# Patient Record
Sex: MALE | Race: WHITE | NOT HISPANIC OR LATINO | Employment: FULL TIME | URBAN - METROPOLITAN AREA
[De-identification: names, ages, dates, MRNs, and addresses within clinical notes are randomized per-mention and may not be internally consistent; named-entity substitution may affect disease eponyms.]

---

## 2017-01-05 ENCOUNTER — APPOINTMENT (OUTPATIENT)
Dept: GENERAL RADIOLOGY | Facility: HOSPITAL | Age: 52
End: 2017-01-05

## 2017-01-05 ENCOUNTER — HOSPITAL ENCOUNTER (EMERGENCY)
Facility: HOSPITAL | Age: 52
Discharge: HOME | End: 2017-01-05
Attending: EMERGENCY MEDICINE | Admitting: EMERGENCY MEDICINE

## 2017-01-05 VITALS
RESPIRATION RATE: 18 BRPM | HEART RATE: 80 BPM | WEIGHT: 225 LBS | DIASTOLIC BLOOD PRESSURE: 88 MMHG | BODY MASS INDEX: 29.82 KG/M2 | TEMPERATURE: 99.5 F | OXYGEN SATURATION: 97 % | SYSTOLIC BLOOD PRESSURE: 133 MMHG | HEIGHT: 73 IN

## 2017-01-05 DIAGNOSIS — M70.21 OLECRANON BURSITIS OF RIGHT ELBOW: Primary | ICD-10-CM

## 2017-01-05 LAB
ALBUMIN SERPL-MCNC: 4.2 G/DL (ref 3.5–5.2)
ALBUMIN/GLOB SERPL: 1.3 G/DL
ALP SERPL-CCNC: 84 U/L (ref 39–117)
ALT SERPL W P-5'-P-CCNC: 38 U/L (ref 1–41)
ANION GAP SERPL CALCULATED.3IONS-SCNC: 15.1 MMOL/L
AST SERPL-CCNC: 35 U/L (ref 1–40)
BASOPHILS # BLD AUTO: 0.05 10*3/MM3 (ref 0–0.2)
BASOPHILS NFR BLD AUTO: 0.5 % (ref 0–1.5)
BILIRUB SERPL-MCNC: 0.6 MG/DL (ref 0.1–1.2)
BUN BLD-MCNC: 18 MG/DL (ref 6–20)
BUN/CREAT SERPL: 19.6 (ref 7–25)
CALCIUM SPEC-SCNC: 9.1 MG/DL (ref 8.6–10.5)
CHLORIDE SERPL-SCNC: 101 MMOL/L (ref 98–107)
CO2 SERPL-SCNC: 24.9 MMOL/L (ref 22–29)
CREAT BLD-MCNC: 0.92 MG/DL (ref 0.76–1.27)
CRP SERPL-MCNC: 0.52 MG/DL (ref 0–0.5)
DEPRECATED RDW RBC AUTO: 46.1 FL (ref 37–54)
EOSINOPHIL # BLD AUTO: 0.13 10*3/MM3 (ref 0–0.7)
EOSINOPHIL NFR BLD AUTO: 1.3 % (ref 0.3–6.2)
ERYTHROCYTE [DISTWIDTH] IN BLOOD BY AUTOMATED COUNT: 13 % (ref 11.5–14.5)
ERYTHROCYTE [SEDIMENTATION RATE] IN BLOOD: 8 MM/HR (ref 0–20)
GFR SERPL CREATININE-BSD FRML MDRD: 86 ML/MIN/1.73
GLOBULIN UR ELPH-MCNC: 3.3 GM/DL
GLUCOSE BLD-MCNC: 122 MG/DL (ref 65–99)
HCT VFR BLD AUTO: 46 % (ref 40.4–52.2)
HGB BLD-MCNC: 14.9 G/DL (ref 13.7–17.6)
IMM GRANULOCYTES # BLD: 0 10*3/MM3 (ref 0–0.03)
IMM GRANULOCYTES NFR BLD: 0 % (ref 0–0.5)
LYMPHOCYTES # BLD AUTO: 2.58 10*3/MM3 (ref 0.9–4.8)
LYMPHOCYTES NFR BLD AUTO: 25.2 % (ref 19.6–45.3)
MCH RBC QN AUTO: 31.3 PG (ref 27–32.7)
MCHC RBC AUTO-ENTMCNC: 32.4 G/DL (ref 32.6–36.4)
MCV RBC AUTO: 96.6 FL (ref 79.8–96.2)
MONOCYTES # BLD AUTO: 1.15 10*3/MM3 (ref 0.2–1.2)
MONOCYTES NFR BLD AUTO: 11.2 % (ref 5–12)
NEUTROPHILS # BLD AUTO: 6.34 10*3/MM3 (ref 1.9–8.1)
NEUTROPHILS NFR BLD AUTO: 61.8 % (ref 42.7–76)
NRBC BLD MANUAL-RTO: 0 /100 WBC (ref 0–0)
PLATELET # BLD AUTO: 264 10*3/MM3 (ref 140–500)
PMV BLD AUTO: 10.4 FL (ref 6–12)
POTASSIUM BLD-SCNC: 4.1 MMOL/L (ref 3.5–5.2)
PROT SERPL-MCNC: 7.5 G/DL (ref 6–8.5)
RBC # BLD AUTO: 4.76 10*6/MM3 (ref 4.6–6)
SODIUM BLD-SCNC: 141 MMOL/L (ref 136–145)
WBC NRBC COR # BLD: 10.25 10*3/MM3 (ref 4.5–10.7)

## 2017-01-05 PROCEDURE — 99283 EMERGENCY DEPT VISIT LOW MDM: CPT

## 2017-01-05 PROCEDURE — 80053 COMPREHEN METABOLIC PANEL: CPT | Performed by: EMERGENCY MEDICINE

## 2017-01-05 PROCEDURE — 86140 C-REACTIVE PROTEIN: CPT | Performed by: NURSE PRACTITIONER

## 2017-01-05 PROCEDURE — 36415 COLL VENOUS BLD VENIPUNCTURE: CPT | Performed by: EMERGENCY MEDICINE

## 2017-01-05 PROCEDURE — 73070 X-RAY EXAM OF ELBOW: CPT

## 2017-01-05 PROCEDURE — 85652 RBC SED RATE AUTOMATED: CPT | Performed by: NURSE PRACTITIONER

## 2017-01-05 PROCEDURE — 85025 COMPLETE CBC W/AUTO DIFF WBC: CPT | Performed by: EMERGENCY MEDICINE

## 2017-01-05 RX ORDER — AMOXICILLIN AND CLAVULANATE POTASSIUM 875; 125 MG/1; MG/1
1 TABLET, FILM COATED ORAL EVERY 12 HOURS
Qty: 20 TABLET | Refills: 0 | Status: SHIPPED | OUTPATIENT
Start: 2017-01-05 | End: 2017-01-13 | Stop reason: HOSPADM

## 2017-01-06 ENCOUNTER — TELEPHONE (OUTPATIENT)
Dept: ORTHOPEDIC SURGERY | Facility: CLINIC | Age: 52
End: 2017-01-06

## 2017-01-06 NOTE — ED PROVIDER NOTES
EMERGENCY DEPARTMENT ENCOUNTER    CHIEF COMPLAINT  Chief Complaint: Elbow Swelling  History given by: Patient  History limited by: n/a  Room Number: 27/27  PMD: JOE Solorzano      HPI:  Pt is a 51 y.o. male who presents complaining of severe pain and swelling to his right elbow worsening over the past 24 hours. Patient is right handed. He has had subjective fever/chills. He has had no known injury or trauma to his elbow.  Patient denies numbness, chest pain, SOB.    Past Medical History includes HTN    Duration: 24 hours  Timing: Constant  Location: Right elbow  Radiation: none  Quality: Throbbing  Intensity/Severity: Severe  Progression: Worsening  Associated Symptoms: chills  Previous Episodes: None  Treatment before arrival: None    PAST MEDICAL HISTORY  Active Ambulatory Problems     Diagnosis Date Noted   • No Active Ambulatory Problems     Resolved Ambulatory Problems     Diagnosis Date Noted   • No Resolved Ambulatory Problems     Past Medical History   Diagnosis Date   • Hyperlipidemia    • Hypertension        PAST SURGICAL HISTORY  History reviewed. No pertinent past surgical history.    FAMILY HISTORY  History reviewed. No pertinent family history.    SOCIAL HISTORY  Social History     Social History   • Marital status:      Spouse name: N/A   • Number of children: N/A   • Years of education: N/A     Occupational History   • Not on file.     Social History Main Topics   • Smoking status: Never Smoker   • Smokeless tobacco: Not on file   • Alcohol use Yes   • Drug use: No   • Sexual activity: Defer     Other Topics Concern   • Not on file     Social History Narrative   • No narrative on file         ALLERGIES  Review of patient's allergies indicates no known allergies.      REVIEW OF SYSTEMS  Review of Systems   Constitutional: Negative for chills and fever.   HENT: Negative for sore throat.    Gastrointestinal: Negative for nausea and vomiting.   Genitourinary: Negative for dysuria.    Musculoskeletal: Negative for back pain.        Right elbow pain & swelling   Skin: Negative for rash.   Psychiatric/Behavioral: The patient is not nervous/anxious.        PHYSICAL EXAM  ED Triage Vitals   Temp Heart Rate Resp BP SpO2   01/05/17 1622 01/05/17 1622 01/05/17 1622 01/05/17 1626 01/05/17 1622   99.7 °F (37.6 °C) 106 18 159/85 97 %      Temp src Heart Rate Source Patient Position BP Location FiO2 (%)   01/05/17 1622 01/05/17 1622 01/05/17 1626 01/05/17 1626 --   Tympanic Monitor Sitting Left arm        Physical Exam   Constitutional: He is well-developed, well-nourished, and in no distress. No distress.   HENT:   Head: Atraumatic.   Mouth/Throat: Mucous membranes are normal.   Eyes: No scleral icterus.   Neck: Normal range of motion.   Cardiovascular: Normal rate, regular rhythm and normal heart sounds.    Pulses:       Radial pulses are 2+ on the right side, and 2+ on the left side.   Pulmonary/Chest: Effort normal and breath sounds normal.   Musculoskeletal: Normal range of motion.        Right elbow: He exhibits swelling (marked swelling over the olecranon). Tenderness found.        Left elbow: Normal.   Neurological: He is alert.   Skin: Skin is warm and dry. There is erythema (right elbow).   Psychiatric: Mood and affect normal.   Nursing note and vitals reviewed.      LAB RESULTS  Recent Results (from the past 24 hour(s))   Comprehensive Metabolic Panel    Collection Time: 01/05/17  4:44 PM   Result Value Ref Range    Glucose 122 (H) 65 - 99 mg/dL    BUN 18 6 - 20 mg/dL    Creatinine 0.92 0.76 - 1.27 mg/dL    Sodium 141 136 - 145 mmol/L    Potassium 4.1 3.5 - 5.2 mmol/L    Chloride 101 98 - 107 mmol/L    CO2 24.9 22.0 - 29.0 mmol/L    Calcium 9.1 8.6 - 10.5 mg/dL    Total Protein 7.5 6.0 - 8.5 g/dL    Albumin 4.20 3.50 - 5.20 g/dL    ALT (SGPT) 38 1 - 41 U/L    AST (SGOT) 35 1 - 40 U/L    Alkaline Phosphatase 84 39 - 117 U/L    Total Bilirubin 0.6 0.1 - 1.2 mg/dL    eGFR Non  Amer 86 >60  mL/min/1.73    Globulin 3.3 gm/dL    A/G Ratio 1.3 g/dL    BUN/Creatinine Ratio 19.6 7.0 - 25.0    Anion Gap 15.1 mmol/L   CBC Auto Differential    Collection Time: 01/05/17  4:44 PM   Result Value Ref Range    WBC 10.25 4.50 - 10.70 10*3/mm3    RBC 4.76 4.60 - 6.00 10*6/mm3    Hemoglobin 14.9 13.7 - 17.6 g/dL    Hematocrit 46.0 40.4 - 52.2 %    MCV 96.6 (H) 79.8 - 96.2 fL    MCH 31.3 27.0 - 32.7 pg    MCHC 32.4 (L) 32.6 - 36.4 g/dL    RDW 13.0 11.5 - 14.5 %    RDW-SD 46.1 37.0 - 54.0 fl    MPV 10.4 6.0 - 12.0 fL    Platelets 264 140 - 500 10*3/mm3    Neutrophil % 61.8 42.7 - 76.0 %    Lymphocyte % 25.2 19.6 - 45.3 %    Monocyte % 11.2 5.0 - 12.0 %    Eosinophil % 1.3 0.3 - 6.2 %    Basophil % 0.5 0.0 - 1.5 %    Immature Grans % 0.0 0.0 - 0.5 %    Neutrophils, Absolute 6.34 1.90 - 8.10 10*3/mm3    Lymphocytes, Absolute 2.58 0.90 - 4.80 10*3/mm3    Monocytes, Absolute 1.15 0.20 - 1.20 10*3/mm3    Eosinophils, Absolute 0.13 0.00 - 0.70 10*3/mm3    Basophils, Absolute 0.05 0.00 - 0.20 10*3/mm3    Immature Grans, Absolute 0.00 0.00 - 0.03 10*3/mm3    nRBC 0.0 0.0 - 0.0 /100 WBC   C-reactive Protein    Collection Time: 01/05/17  4:44 PM   Result Value Ref Range    C-Reactive Protein 0.52 (H) 0.00 - 0.50 mg/dL   Sedimentation Rate    Collection Time: 01/05/17  4:44 PM   Result Value Ref Range    Sed Rate 8 0 - 20 mm/hr       I ordered the above labs and reviewed the results    RADIOLOGY  XR Elbow 2 View Right     FINDINGS: AP and lateral views of the elbow show no evidence of fracture  or dislocation. There is no evidence of a sail sign. There is extensive  soft tissue prominence dorsal to the olecranon process. The appearance  is nonspecific. This may be secondary to previous trauma, inflammation,  or bursitis. Further evaluation could be performed with a MRI  examination of the elbow as indicated.       I ordered the above noted radiological studies and reviewed the images on the PACS system.          PROGRESS AND  "CONSULTS    2022  Reviewed pt's history and workup with Dr. Calzada.  At bedside evaluation, they agree with the plan of care.    DISCHARGE    Patient discharged in stable condition.    Reviewed implications of results, diagnosis, meds, responsibility to follow up, warning signs and symptoms of possible worsening, potential complications and reasons to return to ER, including fever, chills, increased redness/swelling.    Patient/Family voiced understanding of above instructions.    Discussed plan for discharge, as there is no emergent indication for admission.  Pt/family is agreeable and understands need for follow up and repeat testing.  Pt is aware that discharge does not mean that nothing is wrong but it indicates no emergency is present that requires admission and they must continue care with follow-up as given below or physician of their choice.     FOLLOW-UP  JOE Rush  [address] [facility] [address] [phone]    Schedule an appointment as soon as possible for a visit in 1 day      Jovany Cavazos MD  [address] [id number] [phone]    Call in 1 day             amoxicillin-clavulanate 875-125 MG per tablet   Commonly known as:  AUGMENTIN   Take 1 tablet by mouth Every 12 (Twelve) Hours.           COURSE & MEDICAL DECISION MAKING  Pertinent Labs and Imaging studies that were ordered and reviewed are noted above.  Results were reviewed/discussed with the patient and they were also made aware of online assess.     Pt also made aware that some labs, such as cultures, will not be resulted during ER visit and follow up with PMD is necessary.     MEDICATIONS GIVEN IN ER  Medications - No data to display    Visit Vitals   • /88 (BP Location: Left arm, Patient Position: Sitting)   • Pulse 80   • Temp 99.5 °F (37.5 °C) (Oral)   • Resp 18   • Ht 73\" (185.4 cm)   • Wt 225 lb (102 kg)   • SpO2 97%   • BMI 29.69 kg/m2         I personally reviewed the past " medical history, past surgical history, social history, family history, current medications and allergies as they appear in this chart.  The scribe's note accurately reflects the work and decisions made by me.     I personally scribed for NISSA Nolasco 1/5/2017 at 7:05 PM. Electronically signed by Anderson Ying on 1/5/2017 at 7:05 PM.       Anderson Ying  01/05/17 2110       Khloe Motley, RUMA  01/11/17 0838

## 2017-01-06 NOTE — DISCHARGE INSTRUCTIONS
Medications as ordered  Wear ace wrap and ice to elbow  Follow up with pmd/orthopedic md-call in am for appointment  Ibuprofen as needed for pain  Return to er for fever, increased pain/swelling/redness to elbow or any new or worsening symptoms

## 2017-01-06 NOTE — ED PROVIDER NOTES
The patient presents complaining of R elbow swelling onset yesterday. Pt also complains of R elbow pain. Pt denies any trauma or a hx of gout.     Patient is nontoxic appearing   Back/extremities: Redness and inflammation of the R olecranon bursa with limited extension of the R arm      I supervised care provided by the midlevel provider.  We have discussed this patient's history, physical exam, and treatment plan.  I have reviewed the note and personally saw and examined the patient and agree with the plan of care.    Entered by Delfin Vail, acting as scribe for Wu Calzada MD.     Delfin Vail  01/05/17 2016       Wu Calzada MD  01/05/17 5454

## 2017-01-11 ENCOUNTER — TELEPHONE (OUTPATIENT)
Dept: ORTHOPEDIC SURGERY | Facility: CLINIC | Age: 52
End: 2017-01-11

## 2017-01-11 ENCOUNTER — APPOINTMENT (OUTPATIENT)
Dept: PREADMISSION TESTING | Facility: HOSPITAL | Age: 52
End: 2017-01-11

## 2017-01-11 ENCOUNTER — OFFICE VISIT (OUTPATIENT)
Dept: ORTHOPEDIC SURGERY | Facility: CLINIC | Age: 52
End: 2017-01-11

## 2017-01-11 VITALS — BODY MASS INDEX: 29.82 KG/M2 | WEIGHT: 225 LBS | HEIGHT: 73 IN | TEMPERATURE: 97.4 F

## 2017-01-11 DIAGNOSIS — M70.21 OLECRANON BURSITIS OF RIGHT ELBOW: Primary | ICD-10-CM

## 2017-01-11 LAB
ANION GAP SERPL CALCULATED.3IONS-SCNC: 15.8 MMOL/L
BUN BLD-MCNC: 20 MG/DL (ref 6–20)
BUN/CREAT SERPL: 24.7 (ref 7–25)
CALCIUM SPEC-SCNC: 9.6 MG/DL (ref 8.6–10.5)
CHLORIDE SERPL-SCNC: 102 MMOL/L (ref 98–107)
CO2 SERPL-SCNC: 22.2 MMOL/L (ref 22–29)
CREAT BLD-MCNC: 0.81 MG/DL (ref 0.76–1.27)
DEPRECATED RDW RBC AUTO: 45.7 FL (ref 37–54)
ERYTHROCYTE [DISTWIDTH] IN BLOOD BY AUTOMATED COUNT: 12.8 % (ref 11.5–14.5)
GFR SERPL CREATININE-BSD FRML MDRD: 100 ML/MIN/1.73
GLUCOSE BLD-MCNC: 115 MG/DL (ref 65–99)
HCT VFR BLD AUTO: 45.6 % (ref 40.4–52.2)
HGB BLD-MCNC: 14.9 G/DL (ref 13.7–17.6)
MCH RBC QN AUTO: 31.8 PG (ref 27–32.7)
MCHC RBC AUTO-ENTMCNC: 32.7 G/DL (ref 32.6–36.4)
MCV RBC AUTO: 97.4 FL (ref 79.8–96.2)
PLATELET # BLD AUTO: 350 10*3/MM3 (ref 140–500)
PMV BLD AUTO: 10.3 FL (ref 6–12)
POTASSIUM BLD-SCNC: 3.9 MMOL/L (ref 3.5–5.2)
RBC # BLD AUTO: 4.68 10*6/MM3 (ref 4.6–6)
SODIUM BLD-SCNC: 140 MMOL/L (ref 136–145)
WBC NRBC COR # BLD: 8.47 10*3/MM3 (ref 4.5–10.7)

## 2017-01-11 PROCEDURE — 99204 OFFICE O/P NEW MOD 45 MIN: CPT | Performed by: ORTHOPAEDIC SURGERY

## 2017-01-11 PROCEDURE — 87070 CULTURE OTHR SPECIMN AEROBIC: CPT | Performed by: ORTHOPAEDIC SURGERY

## 2017-01-11 PROCEDURE — 36415 COLL VENOUS BLD VENIPUNCTURE: CPT

## 2017-01-11 PROCEDURE — 93005 ELECTROCARDIOGRAM TRACING: CPT

## 2017-01-11 PROCEDURE — 85027 COMPLETE CBC AUTOMATED: CPT | Performed by: ORTHOPAEDIC SURGERY

## 2017-01-11 PROCEDURE — 20605 DRAIN/INJ JOINT/BURSA W/O US: CPT | Performed by: ORTHOPAEDIC SURGERY

## 2017-01-11 PROCEDURE — 80048 BASIC METABOLIC PNL TOTAL CA: CPT | Performed by: ORTHOPAEDIC SURGERY

## 2017-01-11 PROCEDURE — 93010 ELECTROCARDIOGRAM REPORT: CPT | Performed by: INTERNAL MEDICINE

## 2017-01-11 PROCEDURE — 87075 CULTR BACTERIA EXCEPT BLOOD: CPT | Performed by: ORTHOPAEDIC SURGERY

## 2017-01-11 PROCEDURE — 87205 SMEAR GRAM STAIN: CPT | Performed by: ORTHOPAEDIC SURGERY

## 2017-01-11 PROCEDURE — 89051 BODY FLUID CELL COUNT: CPT | Performed by: ORTHOPAEDIC SURGERY

## 2017-01-11 RX ORDER — ATORVASTATIN CALCIUM 10 MG/1
20 TABLET, FILM COATED ORAL DAILY
Status: ON HOLD | COMMUNITY
End: 2017-01-12 | Stop reason: SDUPTHER

## 2017-01-11 NOTE — MR AVS SNAPSHOT
Ochoa Mojica   2017 3:00 PM   Appointment    Provider:  BRAD CORRALES   Department:  [facility]   Dept Phone:  [phone]                Your Full Care Plan           To Do List     2017 3:00 PM     Appointment with  ALESSANDRA CORRALES at [facility] ([phone])   [address] [facility] [address]            Your Updated Medication List          This list is accurate as of: 17  2:59 PM.  Always use your most recent med list.                amoxicillin-clavulanate 875-125 MG per tablet   Commonly known as:  AUGMENTIN   Take 1 tablet by mouth Every 12 (Twelve) Hours.       atorvastatin 10 MG tablet   Commonly known as:  LIPITOR       LISINOPRIL PO       VERAPAMIL HCL ER (CO) PO               [employer] Signup     [facility] [employer] allows you to send messages to your doctor, view your test results, renew your prescriptions, schedule appointments, and more. To sign up, go to [employer] and click on the Sign Up Now link in the New User? box. Enter your Mem[employer] Activation Code exactly as it appears below along with the last four digits of your Social Security Number and your Date of Birth () to complete the sign-up process. If you do not sign up before the expiration date, you must request a new code.    [employer] Activation Code: [email or web address]  Expires: 2017  4:30 PM    If you have questions, you can email [email or web address] or call [phone] to talk to our [employer] staff. Remember, [employer] is NOT to be used for urgent needs. For medical emergencies, dial 911.               Other Info from Your Visit           Allergies     No Known Allergies      Vital Signs     Smoking Status                   Never Smoker             Discharge Instructions       Take the following medications the morning of surgery with a small sip of water.        General Instructions:  • Do not eat  or drink after midnight: includes water, mints, or gum. You may brush your teeth.  • Do not smoke, chew tobacco, or drink alcohol.  • Bring medications in original bottles, any inhalers and if applicable your C-PAP/ BI-PAP machine.  • Bring any papers given to you in the doctor’s office.  • Wear clean comfortable clothes and socks.  • Do not wear contact lenses or make-up.  Bring a case for your glasses if applicable.   • Bring crutches or walker if applicable.  • Leave all other valuables and jewelry at home.    If you were given a blood bank ID arm band remember to bring it with you the day of surgery.    Preventing a Surgical Site Infection:  Shower on the morning of surgery using a fresh bar of anti-bacterial soap (such as Dial) and clean washcloth.  Dry with a clean towel and dress in clean clothing.  For 2 to 3 days before surgery, avoid shaving with a razor near where you will have surgery because the razor can irritate skin and make it easier to develop an infection  Ask your surgeon if you will be receiving antibiotics prior to surgery  Make sure you, your family, and all healthcare providers clean their hands with soap and water or an alcohol based hand  before caring for you or your wound  If at all possible, quit smoking as many days before surgery as you can.    Day of surgery:  Upon arrival, a Pre-op nurse and Anesthesiologist will review your health history, obtain vital signs, and answer questions you may have.  The only belongings needed at this time will be your home medications and if applicable your C-PAP/BI-PAP machine.  If you are staying overnight your family can leave the rest of your belongings in the car and bring them to your room later.  A Pre-op nurse will start an IV and you may receive medication in preparation for surgery, including something to help you relax.  Your family will be able to see you in the Pre-op area.  While you are in surgery your family should notify the  waiting room  if they leave the waiting room area and provide a contact phone number.    Please be aware that surgery does come with discomfort.  We want to make every effort to control your discomfort so please discuss any uncontrolled symptoms with your nurse.   Your doctor will most likely have prescribed pain medications.      If you are going home after surgery you will receive individualized written care instructions before being discharged.  A responsible adult must drive you to and from the hospital on the day of your surgery and stay with you for 24 hours.    If you are staying overnight following surgery, you will be transported to your hospital room following the recovery period.  [facility] has all private rooms.    If you have any questions please call Pre-Admission Testing at [phone].  Deductibles and co-payments are collected on the day of service. Please be prepared to pay the required co-pay, deductible or deposit on the day of service as defined by your plan.       SYMPTOMS OF A STROKE    Call 911 or have someone take you to the Emergency Department if you have any of the following:    · Sudden numbness or weakness of your face, arm or leg especially on one side of the body  · Sudden confusion, diffiiculty speaking or trouble understanding   · Changes in your vision or loss of sight in one eye  · Sudden severe headache with no known cause  · sudden dizziness, trouble walking, loss of balance or coordination    It is important to seek emergency care right away if you have further stroke symptoms. If you get emergency help quickly, the powerful clot-dissolving medicines can reduce the disabilities caused by a stroke.     For more information:    American Stroke Association  2-035-0-STROKE  www.strokeassociation.org           IF YOU SMOKE OR USE TOBACCO PLEASE READ THE FOLLOWING:    Why is smoking bad for me?  Smoking increases the risk of heart disease, lung disease,  vascular disease, stroke, and cancer.     If you smoke, STOP!    If you would like more information on quitting smoking, please visit the [employer] website: [email or web address]   This link will provide additional resources including the QUIT line and the Beat the Pack support groups.     For more information:    American Cancer Society  ([phone] 838-3165    American Heart Association  1-186.942.4553

## 2017-01-11 NOTE — MR AVS SNAPSHOT
Ochoa Mojica   2017 8:00 AM   Office Visit    Dept Phone:  [phone]   Encounter #:  [id number]    Provider:  Jovany Cavazos MD   Department:  [facility]                Your Full Care Plan              Your Updated Medication List          This list is accurate as of: 17  9:09 AM.  Always use your most recent med list.                amoxicillin-clavulanate 875-125 MG per tablet   Commonly known as:  AUGMENTIN   Take 1 tablet by mouth Every 12 (Twelve) Hours.       LISINOPRIL PO       VERAPAMIL HCL ER (CO) PO               You Were Diagnosed With        Codes Comments    Olecranon bursitis of right elbow    -  Primary ICD-10-CM: M70.21  ICD-9-CM: 726.33       Instructions     None    Patient Instructions History      Upcoming Appointments     Visit Type Date Time Department    NEW PATIENT 2017  8:00 AM [facility]      [employer] Signup     [facility] [employer] allows you to send messages to your doctor, view your test results, renew your prescriptions, schedule appointments, and more. To sign up, go to [employer] and click on the Sign Up Now link in the New User? box. Enter your [employer] Activation Code exactly as it appears below along with the last four digits of your Social Security Number and your Date of Birth () to complete the sign-up process. If you do not sign up before the expiration date, you must request a new code.    [employer] Activation Code: [email or web address]  Expires: 2017  4:30 PM    If you have questions, you can email [email or web address] or call [phone] to talk to our [employer] staff. Remember, [employer] is NOT to be used for urgent needs. For medical emergencies, dial 911.               Other Info from Your Visit           Allergies     No Known Allergies      Reason for Visit     Right Elbow - Pain, Edema           Vital Signs     Temperature Height Weight  "Body Mass Index Smoking Status       97.4 °F (36.3 °C) 73\" (185.4 cm) 225 lb (102 kg) 29.69 kg/m2 Never Smoker       Problems and Diagnoses Noted     Olecranon bursitis of right elbow    -  Primary        "

## 2017-01-11 NOTE — DISCHARGE INSTRUCTIONS
Take the following medications the morning of surgery with a small sip of water.        General Instructions:  • Do not eat or drink after midnight: includes water, mints, or gum. You may brush your teeth.  • Do not smoke, chew tobacco, or drink alcohol.  • Bring medications in original bottles, any inhalers and if applicable your C-PAP/ BI-PAP machine.  • Bring any papers given to you in the doctor’s office.  • Wear clean comfortable clothes and socks.  • Do not wear contact lenses or make-up.  Bring a case for your glasses if applicable.   • Bring crutches or walker if applicable.  • Leave all other valuables and jewelry at home.    If you were given a blood bank ID arm band remember to bring it with you the day of surgery.    Preventing a Surgical Site Infection:  Shower on the morning of surgery using a fresh bar of anti-bacterial soap (such as Dial) and clean washcloth.  Dry with a clean towel and dress in clean clothing.  For 2 to 3 days before surgery, avoid shaving with a razor near where you will have surgery because the razor can irritate skin and make it easier to develop an infection  Ask your surgeon if you will be receiving antibiotics prior to surgery  Make sure you, your family, and all healthcare providers clean their hands with soap and water or an alcohol based hand  before caring for you or your wound  If at all possible, quit smoking as many days before surgery as you can.    Day of surgery:  Upon arrival, a Pre-op nurse and Anesthesiologist will review your health history, obtain vital signs, and answer questions you may have.  The only belongings needed at this time will be your home medications and if applicable your C-PAP/BI-PAP machine.  If you are staying overnight your family can leave the rest of your belongings in the car and bring them to your room later.  A Pre-op nurse will start an IV and you may receive medication in preparation for surgery, including something to help you  relax.  Your family will be able to see you in the Pre-op area.  While you are in surgery your family should notify the waiting room  if they leave the waiting room area and provide a contact phone number.    Please be aware that surgery does come with discomfort.  We want to make every effort to control your discomfort so please discuss any uncontrolled symptoms with your nurse.   Your doctor will most likely have prescribed pain medications.      If you are going home after surgery you will receive individualized written care instructions before being discharged.  A responsible adult must drive you to and from the hospital on the day of your surgery and stay with you for 24 hours.    If you are staying overnight following surgery, you will be transported to your hospital room following the recovery period.  [facility] has all private rooms.    If you have any questions please call Pre-Admission Testing at [phone].  Deductibles and co-payments are collected on the day of service. Please be prepared to pay the required co-pay, deductible or deposit on the day of service as defined by your plan.

## 2017-01-11 NOTE — PROGRESS NOTES
"  Patient: Ochoa Mojica    YOB: 1965    Medical Record Number: [id number]    Chief Complaints:  Right elbow pain, swelling    History of Present Illness:     51 y.o. male patient who presents with right elbow pain, swelling.  No discrete precipitating factor.   This first started about 5 days ago.  He tells me the elbow was \"the size of a tomato\".  He was seen in the emergency room and placed on Augmentin for suspected infected olecranon bursitis.  He tells me that the pain and swelling did get somewhat better but he feels that he has plateaued.  Over the last 3 days his symptoms have remained the same.  He complains of pain along the posterior aspect of his elbow along with some redness.  He says that his motion and function are both limited due to the discomfort.  The pain is worse with movement and activity.  Pain is alleviated by rest.  Localizes symptoms to the posterior aspect of the elbow.  Denies other associated complaints or issues.  No fevers, chills, sweats, or other constitutional symptoms.    Allergies: No Known Allergies    Medications:   Home Medications:    Current Outpatient Prescriptions:   •  amoxicillin-clavulanate (AUGMENTIN) 875-125 MG per tablet, Take 1 tablet by mouth Every 12 (Twelve) Hours., Disp: 20 tablet, Rfl: 0  •  LISINOPRIL PO, Take  by mouth., Disp: , Rfl:   •  VERAPAMIL HCL ER, CO, PO, Take  by mouth., Disp: , Rfl:   Past Medical History   Diagnosis Date   • Hyperlipidemia    • Hypertension      Past Surgical History   Procedure Laterality Date   • Back surgery       herniated disc      Social History     Occupational History   • Not on file.     Social History Main Topics   • Smoking status: Never Smoker   • Smokeless tobacco: Not on file   • Alcohol use Yes   • Drug use: No   • Sexual activity: Defer      Social History     Social History Narrative     History reviewed. No pertinent family history.    Review of Systems:      Constitutional: Denies fever, " "shaking or chills   Eyes: Denies change in visual acuity   HEENT: Denies nasal congestion or sore throat   Respiratory: Denies cough or shortness of breath   Cardiovascular: Denies chest pain or edema  Endocrine: Denies tremors, palpitations, intolerance of heat or cold, polyuria, polydipsia.  GI: Denies abdominal pain, nausea, vomiting, bloody stools or diarrhea  : Denies frequency, urgency, incontinence, retention, or nocturia.  Musculoskeletal: Denies numbness tingling or loss of motor function except as above  Integument: Denies rash, lesion or ulceration   Neurologic: Denies headache or focal weakness, deficits  Heme: Denies epistaxis, spontaneous or excessive bleeding, epistaxis, hematuria, melena, fatigue, enlarged or tender lymph nodes.      All other pertinent positives and negatives as noted above in HPI.    Physical Exam: 51 y.o. male  Vitals:    01/11/17 0802   Temp: 97.4 °F (36.3 °C)   Weight: 225 lb (102 kg)   Height: 73\" (185.4 cm)     General:  Patient is awake and alert.  Appears in no acute distress or discomfort.    Psych:  Affect and demeanor are appropriate.    Eyes:  Conjunctiva and sclera appear grossly normal.  Eyes track well and EOM seem to be intact.    Ears:  No gross abnormalities.  Hearing adequate for the exam.    Cardiovascular:  Regular rate and rhythm.    Lungs:  Good chest expansion.  Breathing unlabored.    Extremities:  Skin over posterior aspect of right elbow is without skin breaks, lesions, or ulcerations.  Has significant swelling over back of olecranon along with associated overlying erythema.  There is significant tenderness over the bursa along with increased warmth.  He does not seem to have any tenderness over the elbow joint itself.  Overall, elbow motion is well tolerated and his pain is in the bursa not really the joint.  Range of motion is from 15° shy of full extension to 120° of flexion.  Full pronation and supination.  No instability evident.  Good strength with " elbow flexion, extension, pronation, supination.  Intact sensation distally.  Good radial pulse.  Brisk cap refill.    Radiology:  Outside AP and lateral views of the right elbow are ordered by myself and reviewed to evaluate the patient's complaint.  I have also reviewed the associated report.  No comparison films are immediately available.  The x-rays show no obvious acute abnormalities, lesions, masses, significant degenerative changes.  There is a osteophyte emanating from the posterior aspect of the olecranon along with significant soft tissue edema.    Assessment/Plan:   Right elbow infected olecranon bursitis, overlying cellulitis    I explained to the patient the fact that I think he has an infection.  It appears that it is isolated to the bursa and I see no evidence for septic arthritis.  Given that he has plateaued with the antibiotics and continues to have significant pain, swelling, and erythema, I feel that an aspiration is warranted to confirm the diagnosis.  If it does appear infectious then I think we need to give strong consideration to an open irrigation and debridement, temporary intravenous antibiotics and then gradual transition back to oral antibiotics.  I explained the risks, benefits, and alternative aspiration.  He consented to proceed as described below.    Aspiration of the olecranon bursa demonstrated gross pus.  There was not a sufficient quantity of fluid to send that for a full analysis; however, given what appears to be obvious evidence for infection, I recommended irrigation and debridement of the olecranon bursa.  We thoroughly discussed the risks, benefits, and alternatives.  Specifically, we discussed the risk of recurrent infection, wound healing problems, iatrogenic injury to nearby nerves which could result in permanent weakness or numbness, DVT, PE, positioning related neuropraxia, RSD, and anesthesia related complications potentially resulting in death.  I did explain to him  that my own personal experience has been that wound healing problems tends to be the biggest issue with this particular procedure and he understands this.  We'll plan to proceed with irrigation and debridement tomorrow.  We will have him continue his Augmentin in the meantime.    Medium Joint Arthrocentesis  Date/Time: 1/11/2017 9:12 AM  Consent given by: patient  Site marked: site marked  Timeout: Immediately prior to procedure a time out was called to verify the correct patient, procedure, equipment, support staff and site/side marked as required   Supporting Documentation  Indications: pain, joint swelling and diagnostic evaluation   Procedure Details  Preparation: Patient was prepped and draped in the usual sterile fashion  Needle size: 25 G  Approach: posterior  Aspirate amount: 5 mL  Aspirate: bloody and purulent  Analysis: fluid sample sent for laboratory analysis  Patient tolerance: patient tolerated the procedure well with no immediate complications            Jovany Cavazos MD    01/11/2017    CC to JOE Solorzano

## 2017-01-12 ENCOUNTER — ANESTHESIA (OUTPATIENT)
Dept: PERIOP | Facility: HOSPITAL | Age: 52
End: 2017-01-12

## 2017-01-12 ENCOUNTER — ANESTHESIA EVENT (OUTPATIENT)
Dept: PERIOP | Facility: HOSPITAL | Age: 52
End: 2017-01-12

## 2017-01-12 ENCOUNTER — HOSPITAL ENCOUNTER (INPATIENT)
Facility: HOSPITAL | Age: 52
LOS: 1 days | Discharge: HOME | End: 2017-01-13
Attending: ORTHOPAEDIC SURGERY | Admitting: ORTHOPAEDIC SURGERY

## 2017-01-12 DIAGNOSIS — M70.21 OLECRANON BURSITIS OF RIGHT ELBOW: ICD-10-CM

## 2017-01-12 PROBLEM — M71.129 SEPTIC BURSITIS OF ELBOW: Status: ACTIVE | Noted: 2017-01-12

## 2017-01-12 PROCEDURE — 94799 UNLISTED PULMONARY SVC/PX: CPT

## 2017-01-12 PROCEDURE — 0MB30ZZ EXCISION OF RIGHT ELBOW BURSA AND LIGAMENT, OPEN APPROACH: ICD-10-PCS | Performed by: ORTHOPAEDIC SURGERY

## 2017-01-12 PROCEDURE — 25010000002 FENTANYL CITRATE (PF) 100 MCG/2ML SOLUTION: Performed by: ANESTHESIOLOGY

## 2017-01-12 PROCEDURE — 25010000002 VANCOMYCIN: Performed by: ORTHOPAEDIC SURGERY

## 2017-01-12 PROCEDURE — 87070 CULTURE OTHR SPECIMN AEROBIC: CPT | Performed by: ORTHOPAEDIC SURGERY

## 2017-01-12 PROCEDURE — A4565 SLINGS: HCPCS | Performed by: ORTHOPAEDIC SURGERY

## 2017-01-12 PROCEDURE — 23931 I&D UPR A/E BURSA: CPT | Performed by: ORTHOPAEDIC SURGERY

## 2017-01-12 PROCEDURE — 87205 SMEAR GRAM STAIN: CPT | Performed by: ORTHOPAEDIC SURGERY

## 2017-01-12 PROCEDURE — 25010000002 ONDANSETRON PER 1 MG: Performed by: ANESTHESIOLOGY

## 2017-01-12 PROCEDURE — 25010000002 MIDAZOLAM PER 1 MG: Performed by: ANESTHESIOLOGY

## 2017-01-12 PROCEDURE — 25010000002 PHENYLEPHRINE PER 1 ML: Performed by: ANESTHESIOLOGY

## 2017-01-12 PROCEDURE — 87075 CULTR BACTERIA EXCEPT BLOOD: CPT | Performed by: ORTHOPAEDIC SURGERY

## 2017-01-12 PROCEDURE — 25010000003 CEFAZOLIN IN DEXTROSE 2-4 GM/100ML-% SOLUTION: Performed by: ORTHOPAEDIC SURGERY

## 2017-01-12 PROCEDURE — 25010000002 PROPOFOL 10 MG/ML EMULSION: Performed by: ANESTHESIOLOGY

## 2017-01-12 RX ORDER — NALOXONE HCL 0.4 MG/ML
0.2 VIAL (ML) INJECTION AS NEEDED
Status: DISCONTINUED | OUTPATIENT
Start: 2017-01-12 | End: 2017-01-12 | Stop reason: HOSPADM

## 2017-01-12 RX ORDER — PROMETHAZINE HYDROCHLORIDE 25 MG/1
12.5 TABLET ORAL ONCE AS NEEDED
Status: DISCONTINUED | OUTPATIENT
Start: 2017-01-12 | End: 2017-01-12 | Stop reason: HOSPADM

## 2017-01-12 RX ORDER — HYDROCODONE BITARTRATE AND ACETAMINOPHEN 7.5; 325 MG/1; MG/1
1 TABLET ORAL ONCE AS NEEDED
Status: DISCONTINUED | OUTPATIENT
Start: 2017-01-12 | End: 2017-01-12 | Stop reason: HOSPADM

## 2017-01-12 RX ORDER — MIDAZOLAM HYDROCHLORIDE 1 MG/ML
1 INJECTION INTRAMUSCULAR; INTRAVENOUS
Status: DISCONTINUED | OUTPATIENT
Start: 2017-01-12 | End: 2017-01-12 | Stop reason: HOSPADM

## 2017-01-12 RX ORDER — ONDANSETRON 2 MG/ML
4 INJECTION INTRAMUSCULAR; INTRAVENOUS ONCE AS NEEDED
Status: DISCONTINUED | OUTPATIENT
Start: 2017-01-12 | End: 2017-01-12 | Stop reason: HOSPADM

## 2017-01-12 RX ORDER — LABETALOL HYDROCHLORIDE 5 MG/ML
5 INJECTION, SOLUTION INTRAVENOUS
Status: DISCONTINUED | OUTPATIENT
Start: 2017-01-12 | End: 2017-01-12 | Stop reason: HOSPADM

## 2017-01-12 RX ORDER — NALOXONE HCL 0.4 MG/ML
0.1 VIAL (ML) INJECTION
Status: DISCONTINUED | OUTPATIENT
Start: 2017-01-12 | End: 2017-01-13 | Stop reason: HOSPADM

## 2017-01-12 RX ORDER — DIPHENHYDRAMINE HCL 25 MG
25 CAPSULE ORAL EVERY 6 HOURS PRN
Status: DISCONTINUED | OUTPATIENT
Start: 2017-01-12 | End: 2017-01-13 | Stop reason: HOSPADM

## 2017-01-12 RX ORDER — ONDANSETRON 4 MG/1
4 TABLET, FILM COATED ORAL EVERY 6 HOURS PRN
Status: DISCONTINUED | OUTPATIENT
Start: 2017-01-12 | End: 2017-01-13 | Stop reason: HOSPADM

## 2017-01-12 RX ORDER — BISACODYL 10 MG
10 SUPPOSITORY, RECTAL RECTAL DAILY PRN
Status: DISCONTINUED | OUTPATIENT
Start: 2017-01-12 | End: 2017-01-13 | Stop reason: HOSPADM

## 2017-01-12 RX ORDER — HYDROMORPHONE HYDROCHLORIDE 1 MG/ML
0.25 INJECTION, SOLUTION INTRAMUSCULAR; INTRAVENOUS; SUBCUTANEOUS
Status: DISCONTINUED | OUTPATIENT
Start: 2017-01-12 | End: 2017-01-12 | Stop reason: HOSPADM

## 2017-01-12 RX ORDER — SODIUM CHLORIDE 9 MG/ML
100 INJECTION, SOLUTION INTRAVENOUS CONTINUOUS
Status: DISCONTINUED | OUTPATIENT
Start: 2017-01-12 | End: 2017-01-13 | Stop reason: HOSPADM

## 2017-01-12 RX ORDER — PROMETHAZINE HYDROCHLORIDE 25 MG/1
25 TABLET ORAL ONCE AS NEEDED
Status: DISCONTINUED | OUTPATIENT
Start: 2017-01-12 | End: 2017-01-12 | Stop reason: HOSPADM

## 2017-01-12 RX ORDER — PROMETHAZINE HYDROCHLORIDE 25 MG/ML
12.5 INJECTION, SOLUTION INTRAMUSCULAR; INTRAVENOUS ONCE AS NEEDED
Status: DISCONTINUED | OUTPATIENT
Start: 2017-01-12 | End: 2017-01-12 | Stop reason: HOSPADM

## 2017-01-12 RX ORDER — ONDANSETRON 2 MG/ML
4 INJECTION INTRAMUSCULAR; INTRAVENOUS EVERY 6 HOURS PRN
Status: DISCONTINUED | OUTPATIENT
Start: 2017-01-12 | End: 2017-01-13 | Stop reason: HOSPADM

## 2017-01-12 RX ORDER — LISINOPRIL 20 MG/1
20 TABLET ORAL DAILY
Status: ON HOLD | COMMUNITY
End: 2019-10-17

## 2017-01-12 RX ORDER — ATORVASTATIN CALCIUM 20 MG/1
20 TABLET, FILM COATED ORAL DAILY
Status: ON HOLD | COMMUNITY
End: 2019-10-17

## 2017-01-12 RX ORDER — MAGNESIUM HYDROXIDE 1200 MG/15ML
LIQUID ORAL AS NEEDED
Status: DISCONTINUED | OUTPATIENT
Start: 2017-01-12 | End: 2017-01-12 | Stop reason: HOSPADM

## 2017-01-12 RX ORDER — DIPHENHYDRAMINE HYDROCHLORIDE 50 MG/ML
25 INJECTION INTRAMUSCULAR; INTRAVENOUS EVERY 6 HOURS PRN
Status: DISCONTINUED | OUTPATIENT
Start: 2017-01-12 | End: 2017-01-13 | Stop reason: HOSPADM

## 2017-01-12 RX ORDER — MIDAZOLAM HYDROCHLORIDE 1 MG/ML
2 INJECTION INTRAMUSCULAR; INTRAVENOUS
Status: DISCONTINUED | OUTPATIENT
Start: 2017-01-12 | End: 2017-01-12 | Stop reason: HOSPADM

## 2017-01-12 RX ORDER — LISINOPRIL 20 MG/1
20 TABLET ORAL DAILY
Status: DISCONTINUED | OUTPATIENT
Start: 2017-01-12 | End: 2017-01-13 | Stop reason: HOSPADM

## 2017-01-12 RX ORDER — DOCUSATE SODIUM 100 MG/1
100 CAPSULE, LIQUID FILLED ORAL 2 TIMES DAILY PRN
Status: DISCONTINUED | OUTPATIENT
Start: 2017-01-12 | End: 2017-01-13 | Stop reason: HOSPADM

## 2017-01-12 RX ORDER — DIPHENHYDRAMINE HYDROCHLORIDE 50 MG/ML
12.5 INJECTION INTRAMUSCULAR; INTRAVENOUS
Status: DISCONTINUED | OUTPATIENT
Start: 2017-01-12 | End: 2017-01-12 | Stop reason: HOSPADM

## 2017-01-12 RX ORDER — ACETAMINOPHEN 325 MG/1
325 TABLET ORAL EVERY 4 HOURS PRN
Status: DISCONTINUED | OUTPATIENT
Start: 2017-01-12 | End: 2017-01-13 | Stop reason: HOSPADM

## 2017-01-12 RX ORDER — FENTANYL CITRATE 50 UG/ML
50 INJECTION, SOLUTION INTRAMUSCULAR; INTRAVENOUS
Status: DISCONTINUED | OUTPATIENT
Start: 2017-01-12 | End: 2017-01-12 | Stop reason: HOSPADM

## 2017-01-12 RX ORDER — ATORVASTATIN CALCIUM 20 MG/1
20 TABLET, FILM COATED ORAL DAILY
Status: DISCONTINUED | OUTPATIENT
Start: 2017-01-12 | End: 2017-01-13 | Stop reason: HOSPADM

## 2017-01-12 RX ORDER — GLYCOPYRROLATE 0.2 MG/ML
INJECTION INTRAMUSCULAR; INTRAVENOUS AS NEEDED
Status: DISCONTINUED | OUTPATIENT
Start: 2017-01-12 | End: 2017-01-12 | Stop reason: SURG

## 2017-01-12 RX ORDER — SODIUM CHLORIDE, SODIUM LACTATE, POTASSIUM CHLORIDE, CALCIUM CHLORIDE 600; 310; 30; 20 MG/100ML; MG/100ML; MG/100ML; MG/100ML
9 INJECTION, SOLUTION INTRAVENOUS CONTINUOUS PRN
Status: DISCONTINUED | OUTPATIENT
Start: 2017-01-12 | End: 2017-01-12 | Stop reason: HOSPADM

## 2017-01-12 RX ORDER — ACETAMINOPHEN 325 MG/1
650 TABLET ORAL EVERY 4 HOURS PRN
Status: DISCONTINUED | OUTPATIENT
Start: 2017-01-12 | End: 2017-01-13 | Stop reason: HOSPADM

## 2017-01-12 RX ORDER — SODIUM CHLORIDE 0.9 % (FLUSH) 0.9 %
1-10 SYRINGE (ML) INJECTION AS NEEDED
Status: DISCONTINUED | OUTPATIENT
Start: 2017-01-12 | End: 2017-01-12 | Stop reason: HOSPADM

## 2017-01-12 RX ORDER — PROMETHAZINE HYDROCHLORIDE 25 MG/1
25 SUPPOSITORY RECTAL ONCE AS NEEDED
Status: DISCONTINUED | OUTPATIENT
Start: 2017-01-12 | End: 2017-01-12 | Stop reason: HOSPADM

## 2017-01-12 RX ORDER — OXYCODONE AND ACETAMINOPHEN 7.5; 325 MG/1; MG/1
2 TABLET ORAL EVERY 4 HOURS PRN
Status: DISCONTINUED | OUTPATIENT
Start: 2017-01-12 | End: 2017-01-13 | Stop reason: HOSPADM

## 2017-01-12 RX ORDER — LIDOCAINE HYDROCHLORIDE 20 MG/ML
INJECTION, SOLUTION INFILTRATION; PERINEURAL AS NEEDED
Status: DISCONTINUED | OUTPATIENT
Start: 2017-01-12 | End: 2017-01-12 | Stop reason: SURG

## 2017-01-12 RX ORDER — HYDRALAZINE HYDROCHLORIDE 20 MG/ML
5 INJECTION INTRAMUSCULAR; INTRAVENOUS
Status: DISCONTINUED | OUTPATIENT
Start: 2017-01-12 | End: 2017-01-12 | Stop reason: HOSPADM

## 2017-01-12 RX ORDER — PROPOFOL 10 MG/ML
VIAL (ML) INTRAVENOUS AS NEEDED
Status: DISCONTINUED | OUTPATIENT
Start: 2017-01-12 | End: 2017-01-12 | Stop reason: SURG

## 2017-01-12 RX ORDER — HYDROMORPHONE HYDROCHLORIDE 1 MG/ML
0.5 INJECTION, SOLUTION INTRAMUSCULAR; INTRAVENOUS; SUBCUTANEOUS
Status: DISCONTINUED | OUTPATIENT
Start: 2017-01-12 | End: 2017-01-12 | Stop reason: HOSPADM

## 2017-01-12 RX ORDER — ONDANSETRON 2 MG/ML
INJECTION INTRAMUSCULAR; INTRAVENOUS AS NEEDED
Status: DISCONTINUED | OUTPATIENT
Start: 2017-01-12 | End: 2017-01-12 | Stop reason: SURG

## 2017-01-12 RX ORDER — OXYCODONE AND ACETAMINOPHEN 7.5; 325 MG/1; MG/1
1 TABLET ORAL ONCE AS NEEDED
Status: DISCONTINUED | OUTPATIENT
Start: 2017-01-12 | End: 2017-01-12 | Stop reason: HOSPADM

## 2017-01-12 RX ORDER — FAMOTIDINE 10 MG/ML
20 INJECTION, SOLUTION INTRAVENOUS ONCE
Status: COMPLETED | OUTPATIENT
Start: 2017-01-12 | End: 2017-01-12

## 2017-01-12 RX ORDER — FENTANYL CITRATE 50 UG/ML
INJECTION, SOLUTION INTRAMUSCULAR; INTRAVENOUS AS NEEDED
Status: DISCONTINUED | OUTPATIENT
Start: 2017-01-12 | End: 2017-01-12 | Stop reason: SURG

## 2017-01-12 RX ORDER — CEFAZOLIN SODIUM 2 G/100ML
2 INJECTION, SOLUTION INTRAVENOUS ONCE
Status: COMPLETED | OUTPATIENT
Start: 2017-01-12 | End: 2017-01-12

## 2017-01-12 RX ORDER — HYDROMORPHONE HYDROCHLORIDE 1 MG/ML
0.5 INJECTION, SOLUTION INTRAMUSCULAR; INTRAVENOUS; SUBCUTANEOUS
Status: DISCONTINUED | OUTPATIENT
Start: 2017-01-12 | End: 2017-01-13 | Stop reason: HOSPADM

## 2017-01-12 RX ADMIN — FENTANYL CITRATE 50 MCG: 50 INJECTION INTRAMUSCULAR; INTRAVENOUS at 15:01

## 2017-01-12 RX ADMIN — FENTANYL CITRATE 25 MCG: 50 INJECTION INTRAMUSCULAR; INTRAVENOUS at 15:21

## 2017-01-12 RX ADMIN — PROPOFOL 250 MG: 10 INJECTION, EMULSION INTRAVENOUS at 15:01

## 2017-01-12 RX ADMIN — SODIUM CHLORIDE, POTASSIUM CHLORIDE, SODIUM LACTATE AND CALCIUM CHLORIDE 9 ML/HR: 600; 310; 30; 20 INJECTION, SOLUTION INTRAVENOUS at 11:47

## 2017-01-12 RX ADMIN — MUPIROCIN: 20 OINTMENT TOPICAL at 20:25

## 2017-01-12 RX ADMIN — CEFAZOLIN SODIUM 2 G: 2 INJECTION, SOLUTION INTRAVENOUS at 14:54

## 2017-01-12 RX ADMIN — MIDAZOLAM HYDROCHLORIDE 2 MG: 1 INJECTION, SOLUTION INTRAMUSCULAR; INTRAVENOUS at 11:47

## 2017-01-12 RX ADMIN — ONDANSETRON 4 MG: 2 INJECTION INTRAMUSCULAR; INTRAVENOUS at 15:27

## 2017-01-12 RX ADMIN — SODIUM CHLORIDE 100 ML/HR: 9 INJECTION, SOLUTION INTRAVENOUS at 20:26

## 2017-01-12 RX ADMIN — ATORVASTATIN CALCIUM 20 MG: 20 TABLET, FILM COATED ORAL at 20:25

## 2017-01-12 RX ADMIN — PHENYLEPHRINE HYDROCHLORIDE 100 MCG: 10 INJECTION INTRAVENOUS at 15:05

## 2017-01-12 RX ADMIN — FENTANYL CITRATE 25 MCG: 50 INJECTION INTRAMUSCULAR; INTRAVENOUS at 15:19

## 2017-01-12 RX ADMIN — PHENYLEPHRINE HYDROCHLORIDE 100 MCG: 10 INJECTION INTRAVENOUS at 15:27

## 2017-01-12 RX ADMIN — EPHEDRINE SULFATE 5 MG: 50 INJECTION INTRAMUSCULAR; INTRAVENOUS; SUBCUTANEOUS at 15:27

## 2017-01-12 RX ADMIN — GLYCOPYRROLATE 0.2 MG: 0.2 INJECTION INTRAMUSCULAR; INTRAVENOUS at 15:01

## 2017-01-12 RX ADMIN — VANCOMYCIN HYDROCHLORIDE 2000 MG: 1 INJECTION, POWDER, LYOPHILIZED, FOR SOLUTION INTRAVENOUS at 20:25

## 2017-01-12 RX ADMIN — PHENYLEPHRINE HYDROCHLORIDE 100 MCG: 10 INJECTION INTRAVENOUS at 15:19

## 2017-01-12 RX ADMIN — FAMOTIDINE 20 MG: 10 INJECTION, SOLUTION INTRAVENOUS at 11:47

## 2017-01-12 RX ADMIN — LIDOCAINE HYDROCHLORIDE 60 MG: 20 INJECTION, SOLUTION INFILTRATION; PERINEURAL at 15:01

## 2017-01-12 RX ADMIN — PHENYLEPHRINE HYDROCHLORIDE 100 MCG: 10 INJECTION INTRAVENOUS at 15:23

## 2017-01-12 NOTE — ANESTHESIA PROCEDURE NOTES
Airway  Urgency: elective    Airway not difficult    General Information and Staff    Patient location during procedure: OR  Anesthesiologist: KELVIN العلي    Indications and Patient Condition  Indications for airway management: airway protection    Preoxygenated: yes      Final Airway Details  Final airway type: supraglottic airway      Successful airway: classic  Size 5    Number of attempts at approach: 1    Additional Comments  Smooth IV/mask induction and placement of LMA. Atraumatic, lips/teeth/mouth intact, as preop. +ETCO2, bilateral breath sounds and equal.

## 2017-01-12 NOTE — H&P (VIEW-ONLY)
"  Patient: Ochoa Mojica    YOB: 1965    Medical Record Number: [id number]    Chief Complaints:  Right elbow pain, swelling    History of Present Illness:     51 y.o. male patient who presents with right elbow pain, swelling.  No discrete precipitating factor.   This first started about 5 days ago.  He tells me the elbow was \"the size of a tomato\".  He was seen in the emergency room and placed on Augmentin for suspected infected olecranon bursitis.  He tells me that the pain and swelling did get somewhat better but he feels that he has plateaued.  Over the last 3 days his symptoms have remained the same.  He complains of pain along the posterior aspect of his elbow along with some redness.  He says that his motion and function are both limited due to the discomfort.  The pain is worse with movement and activity.  Pain is alleviated by rest.  Localizes symptoms to the posterior aspect of the elbow.  Denies other associated complaints or issues.  No fevers, chills, sweats, or other constitutional symptoms.    Allergies: No Known Allergies    Medications:   Home Medications:    Current Outpatient Prescriptions:   •  amoxicillin-clavulanate (AUGMENTIN) 875-125 MG per tablet, Take 1 tablet by mouth Every 12 (Twelve) Hours., Disp: 20 tablet, Rfl: 0  •  LISINOPRIL PO, Take  by mouth., Disp: , Rfl:   •  VERAPAMIL HCL ER, CO, PO, Take  by mouth., Disp: , Rfl:   Past Medical History   Diagnosis Date   • Hyperlipidemia    • Hypertension      Past Surgical History   Procedure Laterality Date   • Back surgery       herniated disc      Social History     Occupational History   • Not on file.     Social History Main Topics   • Smoking status: Never Smoker   • Smokeless tobacco: Not on file   • Alcohol use Yes   • Drug use: No   • Sexual activity: Defer      Social History     Social History Narrative     History reviewed. No pertinent family history.    Review of Systems:      Constitutional: Denies fever, " "shaking or chills   Eyes: Denies change in visual acuity   HEENT: Denies nasal congestion or sore throat   Respiratory: Denies cough or shortness of breath   Cardiovascular: Denies chest pain or edema  Endocrine: Denies tremors, palpitations, intolerance of heat or cold, polyuria, polydipsia.  GI: Denies abdominal pain, nausea, vomiting, bloody stools or diarrhea  : Denies frequency, urgency, incontinence, retention, or nocturia.  Musculoskeletal: Denies numbness tingling or loss of motor function except as above  Integument: Denies rash, lesion or ulceration   Neurologic: Denies headache or focal weakness, deficits  Heme: Denies epistaxis, spontaneous or excessive bleeding, epistaxis, hematuria, melena, fatigue, enlarged or tender lymph nodes.      All other pertinent positives and negatives as noted above in HPI.    Physical Exam: 51 y.o. male  Vitals:    01/11/17 0802   Temp: 97.4 °F (36.3 °C)   Weight: 225 lb (102 kg)   Height: 73\" (185.4 cm)     General:  Patient is awake and alert.  Appears in no acute distress or discomfort.    Psych:  Affect and demeanor are appropriate.    Eyes:  Conjunctiva and sclera appear grossly normal.  Eyes track well and EOM seem to be intact.    Ears:  No gross abnormalities.  Hearing adequate for the exam.    Cardiovascular:  Regular rate and rhythm.    Lungs:  Good chest expansion.  Breathing unlabored.    Extremities:  Skin over posterior aspect of right elbow is without skin breaks, lesions, or ulcerations.  Has significant swelling over back of olecranon along with associated overlying erythema.  There is significant tenderness over the bursa along with increased warmth.  He does not seem to have any tenderness over the elbow joint itself.  Overall, elbow motion is well tolerated and his pain is in the bursa not really the joint.  Range of motion is from 15° shy of full extension to 120° of flexion.  Full pronation and supination.  No instability evident.  Good strength with " elbow flexion, extension, pronation, supination.  Intact sensation distally.  Good radial pulse.  Brisk cap refill.    Radiology:  Outside AP and lateral views of the right elbow are ordered by myself and reviewed to evaluate the patient's complaint.  I have also reviewed the associated report.  No comparison films are immediately available.  The x-rays show no obvious acute abnormalities, lesions, masses, significant degenerative changes.  There is a osteophyte emanating from the posterior aspect of the olecranon along with significant soft tissue edema.    Assessment/Plan:   Right elbow infected olecranon bursitis, overlying cellulitis    I explained to the patient the fact that I think he has an infection.  It appears that it is isolated to the bursa and I see no evidence for septic arthritis.  Given that he has plateaued with the antibiotics and continues to have significant pain, swelling, and erythema, I feel that an aspiration is warranted to confirm the diagnosis.  If it does appear infectious then I think we need to give strong consideration to an open irrigation and debridement, temporary intravenous antibiotics and then gradual transition back to oral antibiotics.  I explained the risks, benefits, and alternative aspiration.  He consented to proceed as described below.    Aspiration of the olecranon bursa demonstrated gross pus.  There was not a sufficient quantity of fluid to send that for a full analysis; however, given what appears to be obvious evidence for infection, I recommended irrigation and debridement of the olecranon bursa.  We thoroughly discussed the risks, benefits, and alternatives.  Specifically, we discussed the risk of recurrent infection, wound healing problems, iatrogenic injury to nearby nerves which could result in permanent weakness or numbness, DVT, PE, positioning related neuropraxia, RSD, and anesthesia related complications potentially resulting in death.  I did explain to him  that my own personal experience has been that wound healing problems tends to be the biggest issue with this particular procedure and he understands this.  We'll plan to proceed with irrigation and debridement tomorrow.  We will have him continue his Augmentin in the meantime.    Medium Joint Arthrocentesis  Date/Time: 1/11/2017 9:12 AM  Consent given by: patient  Site marked: site marked  Timeout: Immediately prior to procedure a time out was called to verify the correct patient, procedure, equipment, support staff and site/side marked as required   Supporting Documentation  Indications: pain, joint swelling and diagnostic evaluation   Procedure Details  Preparation: Patient was prepped and draped in the usual sterile fashion  Needle size: 25 G  Approach: posterior  Aspirate amount: 5 mL  Aspirate: bloody and purulent  Analysis: fluid sample sent for laboratory analysis  Patient tolerance: patient tolerated the procedure well with no immediate complications            Jovany Cavazos MD    01/11/2017    CC to JOE Solorzano

## 2017-01-12 NOTE — ANESTHESIA PREPROCEDURE EVALUATION
Anesthesia Evaluation     Patient summary reviewed    Airway   Mallampati: II  no difficulty expected  Dental - normal exam     Pulmonary - normal exam   Cardiovascular   (+) hypertension,     Rhythm: regular    Neuro/Psych  GI/Hepatic/Renal/Endo      Musculoskeletal     Abdominal    Substance History      OB/GYN          Other                             Anesthesia Plan    ASA 2     general     intravenous induction   Anesthetic plan and risks discussed with patient.  Use of blood products discussed with patient .

## 2017-01-12 NOTE — BRIEF OP NOTE
INCISION AND DRAINAGE UPPER EXTREMITY  Procedure Note    Ochoa Mojica  1/12/2017    Pre-op Diagnosis:   Olecranon bursitis of right elbow [M70.21]    Post-op Diagnosis:     Post-Op Diagnosis Codes:     * Olecranon bursitis of right elbow [M70.21]    Procedure/CPT® Codes:      Procedure(s):  INCISION / DEBRIDEMENT BONE ELBOW    Surgeon(s):  Jovany Cavazos MD    Anesthesia: General    Staff:   Circulator: Thi Garcia RN  Scrub Person: Shila Brgigs    Estimated Blood Loss: * No values recorded between 1/12/2017  2:54 PM and 1/12/2017  3:13 PM *    Specimens:                * No specimens in log *      Drains:           Findings: see dictation    Complications: none      Jovany Cavazos MD     Date: 1/12/2017  Time: 3:13 PM

## 2017-01-12 NOTE — PROGRESS NOTES
Pharmacy to dose vancomycin per Dr. Cavazos's request    DX:  Septic bursitis of elbow with I&D on 1/12    Day 1 Vancomycin 2gm (20mg/kg) then 1500mg q 12 hrs.   Trough ordered for 1/14 6am    Plan: check C&S from I&D today    51 y.o.234 lb (106 kg)Estimated Creatinine Clearance: 134.7 mL/min (by C-G formula based on Cr of 0.81).        WOUND CULTURE   Date Value Ref Range Status   01/11/2017 No growth at 24 hours  Preliminary        Results from last 7 days  Lab Units 01/11/17  1458   WBC 10*3/mm3 8.47       Results from last 7 days  Lab Units 01/11/17  1458   CREATININE mg/dL 0.81     Estimated Creatinine Clearance: 134.7 mL/min (by C-G formula based on Cr of 0.81).  Temp Readings from Last 3 Encounters:   01/12/17 99 °F (37.2 °C) (Oral)   01/11/17 97.4 °F (36.3 °C)   01/05/17 99.5 °F (37.5 °C) (Oral)     Jassi Rios, Pharm.D., RP, BCPS

## 2017-01-12 NOTE — PLAN OF CARE
Problem: Patient Care Overview (Adult)  Goal: Plan of Care Review  Outcome: Ongoing (interventions implemented as appropriate)    01/12/17 1809   Coping/Psychosocial Response Interventions   Plan Of Care Reviewed With patient   Patient Care Overview   Progress improving   Outcome Evaluation   Outcome Summary/Follow up Plan DTV, PAIN CONTROLLED, VSS       Goal: Adult Individualization and Mutuality  Outcome: Ongoing (interventions implemented as appropriate)  Goal: Discharge Needs Assessment  Outcome: Ongoing (interventions implemented as appropriate)    Problem: Self-Care Deficit (Adult,Obstetrics,Pediatric)  Goal: Identify Related Risk Factors and Signs and Symptoms  Outcome: Outcome(s) achieved Date Met:  01/12/17 01/12/17 1809   Self-Care Deficit   Self-Care Deficit: Related Risk Factors surgery       Goal: Improved Ability to Perform BADL and IADL  Outcome: Ongoing (interventions implemented as appropriate)    Problem: Pain, Acute (Adult)  Goal: Identify Related Risk Factors and Signs and Symptoms  Outcome: Outcome(s) achieved Date Met:  01/12/17 01/12/17 1809   Pain, Acute   Related Risk Factors (Acute Pain) surgery       Goal: Acceptable Pain Control/Comfort Level  Outcome: Ongoing (interventions implemented as appropriate)    Problem: Fall Risk (Adult)  Goal: Identify Related Risk Factors and Signs and Symptoms  Outcome: Outcome(s) achieved Date Met:  01/12/17 01/12/17 1809   Fall Risk   Fall Risk: Related Risk Factors environment unfamiliar       Goal: Absence of Falls  Outcome: Ongoing (interventions implemented as appropriate)

## 2017-01-12 NOTE — PLAN OF CARE
Problem: Patient Care Overview (Adult)  Goal: Plan of Care Review  Outcome: Ongoing (interventions implemented as appropriate)    01/12/17 1127   Coping/Psychosocial Response Interventions   Plan Of Care Reviewed With patient   Patient Care Overview   Progress no change       Goal: Adult Individualization and Mutuality  Outcome: Ongoing (interventions implemented as appropriate)    01/12/17 1127   Individualization   Patient Specific Preferences Ed       Goal: Discharge Needs Assessment  Outcome: Ongoing (interventions implemented as appropriate)    Problem: Perioperative Period (Adult)  Goal: Signs and Symptoms of Listed Potential Problems Will be Absent or Manageable (Perioperative Period)  Outcome: Ongoing (interventions implemented as appropriate)    01/12/17 1127   Perioperative Period   Problems Assessed (Perioperative Period) all   Problems Present (Perioperative Period) none

## 2017-01-12 NOTE — IP AVS SNAPSHOT
AFTER VISIT SUMMARY             Ochoa EVELIN Galina           About your hospitalization     You were admitted on:  January 12, 2017 You last received care in the:  [address]       Procedures & Surgeries      Procedure(s) (LRB):  INCISION / DEBRIDEMENT BONE ELBOW (Right)     1/12/2017     Surgeon(s):  Jovany Cavazos MD  -------------------      Medications    If you or your caregiver advised us that you are currently taking a medication and that medication is marked below as “Resume”, this simply indicates that we have reviewed those medications to make sure our new therapy recommendations do not interfere.  If you have concerns about medications other than those new ones which we are prescribing today, please consult the physician who prescribed them (or your primary physician).  Our review of your home medications is not meant to indicate that we are directing their use.             Your Medications      START taking these medications     doxycycline 100 MG enteric coated tablet   Take 1 tablet by mouth 2 (Two) Times a Day for 10 days.   Commonly known as:  DORYX           oxyCODONE-acetaminophen 7.5-325 MG per tablet   Take 1-2 tabs po q 4 hours prn pain   Commonly known as:  PERCOCET             CONTINUE taking these medications     atorvastatin 20 MG tablet   Take 20 mg by mouth Daily.   Last time this was given:  1/13/2017  8:33 AM   Commonly known as:  LIPITOR   Next Dose Due:  1/14/17           lisinopril 20 MG tablet   Take 20 mg by mouth Daily.   Commonly known as:  PRINIVIL,ZESTRIL   Next Dose Due:  1/14/17           verapamil  MG CR tablet   Take 180 mg by mouth Every Morning.   Commonly known as:  CALAN-SR   Next Dose Due:  1/14/17             STOP taking these medications     amoxicillin-clavulanate 875-125 MG per tablet   Commonly known as:  AUGMENTIN                Where to Get Your Medications      These medications were sent to [facility]/pharmacy #[id number] - [place] -  [address] AT [facility] [phone]  - [phone] FX  [address] [facility] [address]     Phone:  [phone]     doxycycline 100 MG enteric coated tablet         Information about where to get these medications is not yet available     ! Ask your nurse or doctor about these medications     oxyCODONE-acetaminophen 7.5-325 MG per tablet                  Your Medications      Your Medication List           Morning Noon Evening Bedtime As Needed    atorvastatin 20 MG tablet   Take 20 mg by mouth Daily.   Commonly known as:  LIPITOR                                   doxycycline 100 MG enteric coated tablet   Take 1 tablet by mouth 2 (Two) Times a Day for 10 days.   Commonly known as:  DORYX                                      lisinopril 20 MG tablet   Take 20 mg by mouth Daily.   Commonly known as:  PRINIVIL,ZESTRIL                                   oxyCODONE-acetaminophen 7.5-325 MG per tablet   Take 1-2 tabs po q 4 hours prn pain   Commonly known as:  PERCOCET                                   verapamil  MG CR tablet   Take 180 mg by mouth Every Morning.   Commonly known as:  CALAN-SR                                            Instructions for After Discharge        Other Instructions     Follow anesthesia standing orders.                 Discharge References/Attachments     INCISION AND DRAINAGE, CARE AFTER (ENGLISH)    ACETAMINOPHEN; OXYCODONE TABLETS (ENGLISH)       Follow-ups for After Discharge        Follow-up Information     Follow up with Jovany Cavazos MD. Schedule an appointment as soon as possible for a visit in 2 week(s).    Specialty:  Orthopedic Surgery    Why:  followup in 2 weeks, call to make appointment    Contact information:    [address]  [facility] [address]  [facility] [id number] [phone]          Follow up with JOE Solorzano .    Specialty:  Family Medicine    Contact information:    [address]  [facility]  [address]  [phone]        Referrals and Follow-ups to Schedule     Obtain informed consent    As directed    Informed consent given for:  Irrigation and debridement of right elbow   Laterality:  Right       Return to Clinic for Follow Up    As directed    Return to the office in 2 weeks             3D Data Signup     [facility] allows you to send messages to your doctor, view your test results, renew your prescriptions, schedule appointments, and more. To sign up, go to [employer] and click on the Sign Up Now link in the New User? box. Enter your 3D Data Activation Code exactly as it appears below along with the last four digits of your Social Security Number and your Date of Birth () to complete the sign-up process. If you do not sign up before the expiration date, you must request a new code.    3D Data Activation Code: [email or web address]  Expires: 2017  4:30 PM    If you have questions, you can email [email or web address] or call [phone] to talk to our 3D Data staff. Remember, 3D Data is NOT to be used for urgent needs. For medical emergencies, dial 911.           Summary of Your Hospitalization        Reason for Hospitalization     Your primary diagnosis was:  Not on File    Your diagnoses also included:  Bursitis      Care Providers     Provider Service Role Specialty    Jovany Cavazos MD -- Attending Provider Orthopedic Surgery    Jovany Cavazos MD -- Surgeon  Orthopedic Surgery      Your Allergies  Date Reviewed: 2017    No active allergies      Pending Labs     Order Current Status    Anaerobic Culture In process    Anaerobic Culture In process    Wound Culture Preliminary result    Wound Culture Preliminary result      Patient Belongings Returned     Document Return of Belongings Flowsheet     Were the patient bedside belongings sent home?   Yes   Belongings Retrieved from Security & Sent Home   N/A    Belongings Sent to Safe   --   Medications  Retrieved from Pharmacy & Sent Home   N/A              More Information      Incision and Drainage, Care After  Refer to this sheet in the next few weeks. These instructions provide you with information on caring for yourself after your procedure. Your caregiver may also give you more specific instructions. Your treatment has been planned according to current medical practices, but problems sometimes occur. Call your caregiver if you have any problems or questions after your procedure.  HOME CARE INSTRUCTIONS   · If antibiotic medicine is given, take it as directed. Finish it even if you start to feel better.  · Only take over-the-counter or prescription medicines for pain, discomfort, or fever as directed by your caregiver.  · Keep all follow-up appointments as directed by your caregiver.  · Change any bandages (dressings) as directed by your caregiver. Replace old dressings with clean dressings.  · Wash your hands before and after caring for your wound.  You will receive specific instructions for cleansing and caring for your wound.   SEEK MEDICAL CARE IF:   · You have increased pain, swelling, or redness around the wound.  · You have increased drainage, smell, or bleeding from the wound.  · You have muscle aches, chills, or you feel generally sick.  · You have a fever.  MAKE SURE YOU:   · Understand these instructions.  · Will watch your condition.  · Will get help right away if you are not doing well or get worse.     This information is not intended to replace advice given to you by your health care provider. Make sure you discuss any questions you have with your health care provider.     Document Released: 03/11/2013 Document Revised: 01/08/2016 Document Reviewed: 03/11/2013  [employer]I Interactive Patient Education ©2016 Elsevier Inc.          Acetaminophen; Oxycodone tablets  What is this medicine?  ACETAMINOPHEN; OXYCODONE (a set a AMARILYS jacqueline fen; ox i KOE done) is a pain reliever. It is used to treat moderate  to severe pain.  This medicine may be used for other purposes; ask your health care provider or pharmacist if you have questions.  What should I tell my health care provider before I take this medicine?  They need to know if you have any of these conditions:  -brain tumor  -Crohn's disease, inflammatory bowel disease, or ulcerative colitis  -drug abuse or addiction  -head injury  -heart or circulation problems  -if you often drink alcohol  -kidney disease or problems going to the bathroom  -liver disease  -lung disease, asthma, or breathing problems  -an unusual or allergic reaction to acetaminophen, oxycodone, other opioid analgesics, other medicines, foods, dyes, or preservatives  -pregnant or trying to get pregnant  -breast-feeding  How should I use this medicine?  Take this medicine by mouth with a full glass of water. Follow the directions on the prescription label. You can take it with or without food. If it upsets your stomach, take it with food. Take your medicine at regular intervals. Do not take it more often than directed.  Talk to your pediatrician regarding the use of this medicine in children. Special care may be needed.  Patients over 65 years old may have a stronger reaction and need a smaller dose.  Overdosage: If you think you have taken too much of this medicine contact a poison control center or emergency room at once.  NOTE: This medicine is only for you. Do not share this medicine with others.  What if I miss a dose?  If you miss a dose, take it as soon as you can. If it is almost time for your next dose, take only that dose. Do not take double or extra doses.  What may interact with this medicine?  -alcohol  -antihistamines  -barbiturates like amobarbital, butalbital, butabarbital, methohexital, pentobarbital, phenobarbital, thiopental, and secobarbital  -benztropine  -drugs for bladder problems like solifenacin, trospium, oxybutynin, tolterodine, hyoscyamine, and methscopolamine  -drugs for  breathing problems like ipratropium and tiotropium  -drugs for certain stomach or intestine problems like propantheline, homatropine methylbromide, glycopyrrolate, atropine, belladonna, and dicyclomine  -general anesthetics like etomidate, ketamine, nitrous oxide, propofol, desflurane, enflurane, halothane, isoflurane, and sevoflurane  -medicines for depression, anxiety, or psychotic disturbances  -medicines for sleep  -muscle relaxants  -naltrexone  -narcotic medicines (opiates) for pain  -phenothiazines like perphenazine, thioridazine, chlorpromazine, mesoridazine, fluphenazine, prochlorperazine, promazine, and trifluoperazine  -scopolamine  -tramadol  -trihexyphenidyl  This list may not describe all possible interactions. Give your health care provider a list of all the medicines, herbs, non-prescription drugs, or dietary supplements you use. Also tell them if you smoke, drink alcohol, or use illegal drugs. Some items may interact with your medicine.  What should I watch for while using this medicine?  Tell your doctor or health care professional if your pain does not go away, if it gets worse, or if you have new or a different type of pain. You may develop tolerance to the medicine. Tolerance means that you will need a higher dose of the medication for pain relief. Tolerance is normal and is expected if you take this medicine for a long time.  Do not suddenly stop taking your medicine because you may develop a severe reaction. Your body becomes used to the medicine. This does NOT mean you are addicted. Addiction is a behavior related to getting and using a drug for a non-medical reason. If you have pain, you have a medical reason to take pain medicine. Your doctor will tell you how much medicine to take. If your doctor wants you to stop the medicine, the dose will be slowly lowered over time to avoid any side effects.  You may get drowsy or dizzy. Do not drive, use machinery, or do anything that needs mental  alertness until you know how this medicine affects you. Do not stand or sit up quickly, especially if you are an older patient. This reduces the risk of dizzy or fainting spells. Alcohol may interfere with the effect of this medicine. Avoid alcoholic drinks.  There are different types of narcotic medicines (opiates) for pain. If you take more than one type at the same time, you may have more side effects. Give your health care provider a list of all medicines you use. Your doctor will tell you how much medicine to take. Do not take more medicine than directed. Call emergency for help if you have problems breathing.  The medicine will cause constipation. Try to have a bowel movement at least every 2 to 3 days. If you do not have a bowel movement for 3 days, call your doctor or health care professional.  Do not take Tylenol (acetaminophen) or medicines that have acetaminophen with this medicine. Too much acetaminophen can be very dangerous. Many nonprescription medicines contain acetaminophen. Always read the labels carefully to avoid taking more acetaminophen.  What side effects may I notice from receiving this medicine?  Side effects that you should report to your doctor or health care professional as soon as possible:  -allergic reactions like skin rash, itching or hives, swelling of the face, lips, or tongue  -breathing difficulties, wheezing  -confusion  -light headedness or fainting spells  -severe stomach pain  -unusually weak or tired  -yellowing of the skin or the whites of the eyes  Side effects that usually do not require medical attention (report to your doctor or health care professional if they continue or are bothersome):  -dizziness  -drowsiness  -nausea  -vomiting  This list may not describe all possible side effects. Call your doctor for medical advice about side effects. You may report side effects to FDA at 2-570-FDA-2998.  Where should I keep my medicine?  Keep out of the reach of children. This  medicine can be abused. Keep your medicine in a safe place to protect it from theft. Do not share this medicine with anyone. Selling or giving away this medicine is dangerous and against the law.  This medicine may cause accidental overdose and death if it taken by other adults, children, or pets. Mix any unused medicine with a substance like cat litter or coffee grounds. Then throw the medicine away in a sealed container like a sealed bag or a coffee can with a lid. Do not use the medicine after the expiration date.  Store at room temperature between 20 and 25 degrees C (68 and 77 degrees F).  NOTE: This sheet is a summary. It may not cover all possible information. If you have questions about this medicine, talk to your doctor, pharmacist, or health care provider.     © 2016, Elsevier/Gold Standard. (2015-11-18 15:18:46)         PREVENTING SURGICAL SITE INFECTIONS     Surgical Site Infections FAQs  What is a Surgical Site Infection (SSI)?  A surgical site infection is an infection that occurs after surgery in the part of the body where the surgery took place. Most patients who have surgery do not develop an infection. However, infections develop in about 1 to 3 out of every 100 patients who have surgery.  Some of the common symptoms of a surgical site infection are:  · Redness and pain around the area where you had surgery  · Drainage of cloudy fluid from your surgical wound  · Fever  Can SSIs be treated?  Yes. Most surgical site infections can be treated with antibiotics. The antibiotic given to you depends on the bacteria (germs) causing the infection. Sometimes patients with SSIs also need another surgery to treat the infection.  What are some of the things that hospitals are doing to prevent SSIs?  To prevent SSIs, doctors, nurses, and other healthcare providers:  · Clean their hands and arms up to their elbows with an antiseptic agent just before the surgery.  · Clean their hands with soap and water or an  alcohol-based hand rub before and after caring for each patient.  · May remove some of your hair immediately before your surgery using electric clippers if the hair is in the same area where the procedure will occur. They should not shave you with a razor.  · Wear special hair covers, masks, gowns, and gloves during surgery to keep the surgery area clean.  · Give you antibiotics before your surgery starts. In most cases, you should get antibiotics within 60 minutes before the surgery starts and the antibiotics should be stopped within 24 hours after surgery.  · Clean the skin at the site of your surgery with a special soap that kills germs.  What can I do to help prevent SSIs?  Before your surgery:  · Tell your doctor about other medical problems you may have. Health problems such as allergies, diabetes, and obesity could affect your surgery and your treatment.  · Quit smoking. Patients who smoke get more infections. Talk to your doctor about how you can quit before your surgery.  · Do not shave near where you will have surgery. Shaving with a razor can irritate your skin and make it easier to develop an infection.  At the time of your surgery:  · Speak up if someone tries to shave you with a razor before surgery. Ask why you need to be shaved and talk with your surgeon if you have any concerns.  · Ask if you will get antibiotics before surgery.  After your surgery:  · Make sure that your healthcare providers clean their hands before examining you, either with soap and water or an alcohol-based hand rub.    If you do not see your providers clean their hands, please ask them to do so.  · Family and friends who visit you should not touch the surgical wound or dressings.  · Family and friends should clean their hands with soap and water or an alcohol-based hand rub before and after visiting you. If you do not see them clean their hands, ask them to clean their hands.  What do I need to do when I go home from the  hospital?  · Before you go home, your doctor or nurse should explain everything you need to know about taking care of your wound. Make sure you understand how to care for your wound before you leave the hospital.  · Always clean your hands before and after caring for your wound.  · Before you go home, make sure you know who to contact if you have questions or problems after you get home.  · If you have any symptoms of an infection, such as redness and pain at the surgery site, drainage, or fever, call your doctor immediately.  If you have additional questions, please ask your doctor or nurse.  Developed and co-sponsored by The Society for Healthcare Epidemiology of [place] (SHEA); Infectious Diseases Society of [place] (IDSA); American Hospital Association; Association for Professionals in Infection Control and Epidemiology (APIC); Centers for Disease Control and Prevention (CDC); and The Joint Commission.     This information is not intended to replace advice given to you by your health care provider. Make sure you discuss any questions you have with your health care provider.     Document Released: 12/23/2014 Document Revised: 01/08/2016 Document Reviewed: 03/02/2016  [employer] Interactive Patient Education ©2016 [employer] Inc.             SYMPTOMS OF A STROKE    Call 911 or have someone take you to the Emergency Department if you have any of the following:    · Sudden numbness or weakness of your face, arm or leg especially on one side of the body  · Sudden confusion, diffiiculty speaking or trouble understanding   · Changes in your vision or loss of sight in one eye  · Sudden severe headache with no known cause  · sudden dizziness, trouble walking, loss of balance or coordination    It is important to seek emergency care right away if you have further stroke symptoms. If you get emergency help quickly, the powerful clot-dissolving medicines can reduce the disabilities caused by a stroke.     For more  information:    American Stroke Association  5-701-3-STROKE  www.strokeassociation.org           IF YOU SMOKE OR USE TOBACCO PLEASE READ THE FOLLOWING:    Why is smoking bad for me?  Smoking increases the risk of heart disease, lung disease, vascular disease, stroke, and cancer.     If you smoke, STOP!    If you would like more information on quitting smoking, please visit the [employer] website: www.Shoulder Options/Leiyooate/healthier-together/smoke   This link will provide additional resources including the QUIT line and the Beat the Pack support groups.     For more information:    American Cancer Society  ([phone]    American Heart Association  1-742.242.8682               YOU ARE THE MOST IMPORTANT FACTOR IN YOUR RECOVERY.     Follow all instructions carefully.     I have reviewed my discharge instructions with my nurse, including the following information, if applicable:     Information about my illness and diagnosis   Follow up appointments (including lab draws)   Wound Care   Equipment Needs   Medications (new and continuing) along with side effects   Preventative information such as vaccines and smoking cessations   Diet   Pain   I know when to contact my Doctor's office or seek emergency care      I want my nurse to describe the side effects of my medications: YES NO   If the answer is no, I understand the side effects of my medications: YES NO   My nurse described the side effects of my medications in a way that I could understand: YES NO   I have taken my personal belongings and my own medications with me at discharge: YES NO            I have received this information and my questions have been answered. I have discussed any concerns I see with this plan with the nurse or physician. I understand these instructions.    Signature of Patient or Responsible Person: _____________________________________    Date: _________________  Time: __________________    Signature of Healthcare  Provider: _______________________________________  Date: _________________  Time: __________________

## 2017-01-12 NOTE — ANESTHESIA POSTPROCEDURE EVALUATION
Patient: Ochoa Mojica    Procedure Summary     Date Anesthesia Start Anesthesia Stop Room / Location    01/12/17 1454 1546  [facility] /  [facility]       Procedure Diagnosis Surgeon Provider    INCISION / DEBRIDEMENT BONE ELBOW (Right Arm Upper) Olecranon bursitis of right elbow  (Olecranon bursitis of right elbow [M70.21]) MD Anders Denton MD          Anesthesia Type: general  Last vitals  /76 (01/12/17 1640)    Temp      Pulse 84 (01/12/17 1640)   Resp 16 (01/12/17 1640)    SpO2 95 % (01/12/17 1640)      Post Anesthesia Care and Evaluation    Patient location during evaluation: bedside  Patient participation: complete - patient participated  Level of consciousness: awake  Pain management: adequate  Airway patency: patent  Anesthetic complications: No anesthetic complications    Cardiovascular status: acceptable  Respiratory status: acceptable  Hydration status: acceptable

## 2017-01-12 NOTE — PLAN OF CARE
Problem: Perioperative Period (Adult)  Goal: Signs and Symptoms of Listed Potential Problems Will be Absent or Manageable (Perioperative Period)  Outcome: Ongoing (interventions implemented as appropriate)    01/12/17 4715   Perioperative Period   Problems Assessed (Perioperative Period) pain;hemorrhage;hypoxia/hypoxemia;situational response   Problems Present (Perioperative Period) none

## 2017-01-13 VITALS
TEMPERATURE: 97.9 F | RESPIRATION RATE: 18 BRPM | SYSTOLIC BLOOD PRESSURE: 125 MMHG | HEART RATE: 76 BPM | WEIGHT: 234 LBS | DIASTOLIC BLOOD PRESSURE: 82 MMHG | OXYGEN SATURATION: 98 % | BODY MASS INDEX: 31.01 KG/M2 | HEIGHT: 73 IN

## 2017-01-13 PROCEDURE — 25010000002 VANCOMYCIN: Performed by: ORTHOPAEDIC SURGERY

## 2017-01-13 RX ORDER — DOXYCYCLINE HYCLATE 100 MG/1
100 TABLET, DELAYED RELEASE ORAL 2 TIMES DAILY
Qty: 20 TABLET | Refills: 0 | Status: SHIPPED | OUTPATIENT
Start: 2017-01-13 | End: 2017-01-23

## 2017-01-13 RX ORDER — OXYCODONE AND ACETAMINOPHEN 7.5; 325 MG/1; MG/1
TABLET ORAL
Qty: 40 TABLET | Refills: 0
Start: 2017-01-13 | End: 2019-10-01

## 2017-01-13 RX ADMIN — MUPIROCIN: 20 OINTMENT TOPICAL at 17:57

## 2017-01-13 RX ADMIN — ATORVASTATIN CALCIUM 20 MG: 20 TABLET, FILM COATED ORAL at 08:33

## 2017-01-13 RX ADMIN — VANCOMYCIN HYDROCHLORIDE 1500 MG: 1 INJECTION, POWDER, LYOPHILIZED, FOR SOLUTION INTRAVENOUS at 18:03

## 2017-01-13 RX ADMIN — VANCOMYCIN HYDROCHLORIDE 1500 MG: 1 INJECTION, POWDER, LYOPHILIZED, FOR SOLUTION INTRAVENOUS at 06:05

## 2017-01-13 RX ADMIN — MUPIROCIN: 20 OINTMENT TOPICAL at 08:33

## 2017-01-13 NOTE — PLAN OF CARE
Problem: Patient Care Overview (Adult)  Goal: Plan of Care Review  Outcome: Ongoing (interventions implemented as appropriate)    01/13/17 0505   Coping/Psychosocial Response Interventions   Plan Of Care Reviewed With patient   Patient Care Overview   Progress improving   Outcome Evaluation   Outcome Summary/Follow up Plan pt voices no pain, voiding without difficulty       Goal: Adult Individualization and Mutuality  Outcome: Ongoing (interventions implemented as appropriate)    01/13/17 0505   Individualization   Patient Specific Preferences goes by Ed   Patient Specific Goals pain control and improved infection   Patient Specific Interventions offer PRN pain meds, administer abx as prescribed       Goal: Discharge Needs Assessment  Outcome: Ongoing (interventions implemented as appropriate)    01/12/17 1740 01/13/17 0505   Discharge Needs Assessment   Concerns To Be Addressed --  denies needs/concerns at this time   Readmission Within The Last 30 Days --  no previous admission in last 30 days   Equipment Needed After Discharge --  wound care supplies   Discharge Disposition --  still a patient   Current Health   Anticipated Changes Related to Illness --  none   Living Environment   Transportation Available --  car;family or friend will provide   Self-Care   Equipment Currently Used at Home none --          Problem: Perioperative Period (Adult)  Goal: Signs and Symptoms of Listed Potential Problems Will be Absent or Manageable (Perioperative Period)  Outcome: Ongoing (interventions implemented as appropriate)    01/13/17 0505   Perioperative Period   Problems Assessed (Perioperative Period) all   Problems Present (Perioperative Period) pain;situational response         Problem: Self-Care Deficit (Adult,Obstetrics,Pediatric)  Goal: Improved Ability to Perform BADL and IADL  Outcome: Ongoing (interventions implemented as appropriate)    01/13/17 0505   Self-Care Deficit (Adult,Obstetrics,Pediatric)   Improved  Ability to Perform BADL and IADL making progress toward outcome         Problem: Fall Risk (Adult)  Goal: Absence of Falls  Outcome: Ongoing (interventions implemented as appropriate)    01/13/17 1099   Fall Risk (Adult)   Absence of Falls achieves outcome  (no falls on my shift)

## 2017-01-13 NOTE — OP NOTE
Date of Procedure:  01/12/2017     PREOPERATIVE DIAGNOSIS: Right elbow septic olecranon bursitis.     POSTOPERATIVE DIAGNOSIS: Right elbow septic olecranon bursitis.     PROCEDURE PERFORMED: Irrigation and debridement of right elbow septic olecranon bursitis.     SURGEON: Jovany Cavazos MD     ANESTHESIA: General.     COMPLICATION: None.     SPECIMEN: Intraoperative wound cultures x2.     ESTIMATED BLOOD LOSS: Less than 20 mL.     INDICATIONS: The patient presented to my office yesterday with an apparent septic bursitis of the right elbow.     INFORMED CONSENT:  The risks, benefits, and alternatives to irrigation and debridement were discussed with him in detail. He acknowledged understanding of the information and consented to proceed.     DESCRIPTION OF PROCEDURE: The patient and operative site were identified in the preoperative holding area. The surgical site was marked. Following this the patient was taken to the operating room and placed in supine position. Adequate general anesthesia was administered. Then he was positioned on the operating table. The right upper extremity was prepped and draped in the standard sterile fashion. I cleaned the extremity with an alcohol solution. A Hibiclens scrub was performed and then the extremity was prepped with ChloraPrep x2. I allowed those to dry for 3 minutes before the draping procedure was carried out. Timeout was taken and preoperative antibiotics were administered prior to surgical incision. I began the procedure by fashioning an approximately 2 cm incision over the lateral aspect of the bursa, carried this down through the skin and subcutaneous tissues, and then bluntly dissected down to the bursa. I opened up the bursa and encountered a significant amount of purulent-appearing material and 2 culture samples were sent for pathologic analysis. I removed all of the purulent material and then I debrided the bursa extensively using a combination of scalpel and  janey. Once I completed the debridement of all of the necrotic-appearing tissue. I irrigated out the bursa with 500 mL of sterile saline mixed with bacitracin and then directed my attention to closure. The instruments were withdrawn. The wound was copiously irrigated out with sterile saline and closed with a nylon suture. Sterile dressings were applied to the wound and the drapes withdrawn. The patient tolerated the procedure well. There were no complications. He was awakened and taken to the recovery room in good condition.       ARVIND Monzon:ab  D:   01/12/2017 15:53:50  T:   01/12/2017 22:12:30  Job ID:   [id number]  Document ID:   [id number]  cc:

## 2017-01-13 NOTE — PAYOR COMM NOTE
"UR NURSE:  KIERRA B.  P:  [phone]  F:  [phone]    Ochoa Mojica (51 y.o. Male)     Date of Birth Social Security Number Address Home Phone MRN    1965  [id number] [facility] [id number] [phone] [id number]    [facility] [employer] Status          [place]        Admission Date Admission Type Admitting Provider Attending Provider Department, Room/Bed    1/12/17 Elective Jovany Cavazos MD McClure, Scott B, MD [facility] [address], P879/1    Discharge Date Discharge Disposition Discharge Destination                      Attending Provider: Jovany Cavazos MD     Allergies:  No Known Allergies    Isolation:  None   Infection:  None   Code Status:  FULL    Ht:  73\" (185.4 cm)   Wt:  234 lb (106 kg)    Admission Cmt:  None   Principal Problem:  None                Active Insurance as of 1/12/2017     Primary Coverage     Payor Plan Insurance Group Employer/Plan Group    ANTHEM BLUE CROSS ANTHEM BLUE CROSS BLUE SHIELD [facility] [id number]     Payor Plan Address Payor Plan Phone Number Effective From Effective To    [address] [phone] 1/1/2016     [place] [address]       Subscriber Name Subscriber Birth Date Member ID       OCHOA MOJICA 1965 [id number]                 Emergency Contacts      (Rel.) Home Phone Work Phone Mobile Phone    Khloe Mojica (Spouse) [phone] -- --               History & Physical      Jovany Cavazos MD at 1/11/2017  8:00 AM            Patient: Ochoa Mojica    YOB: 1965    Medical Record Number: [id number]    Chief Complaints:  Right elbow pain, swelling    History of Present Illness:     51 y.o. male patient who presents with right elbow pain, swelling.  No discrete precipitating factor.   This first started about 5 days ago.  He tells me the elbow was \"the size of a tomato\".  He was seen in the emergency room and placed on Augmentin for suspected infected olecranon bursitis.  He " tells me that the pain and swelling did get somewhat better but he feels that he has plateaued.  Over the last 3 days his symptoms have remained the same.  He complains of pain along the posterior aspect of his elbow along with some redness.  He says that his motion and function are both limited due to the discomfort.  The pain is worse with movement and activity.  Pain is alleviated by rest.  Localizes symptoms to the posterior aspect of the elbow.  Denies other associated complaints or issues.  No fevers, chills, sweats, or other constitutional symptoms.    Allergies: No Known Allergies    Medications:   Home Medications:    Current Outpatient Prescriptions:   •  amoxicillin-clavulanate (AUGMENTIN) 875-125 MG per tablet, Take 1 tablet by mouth Every 12 (Twelve) Hours., Disp: 20 tablet, Rfl: 0  •  LISINOPRIL PO, Take  by mouth., Disp: , Rfl:   •  VERAPAMIL HCL ER, CO, PO, Take  by mouth., Disp: , Rfl:   Past Medical History   Diagnosis Date   • Hyperlipidemia    • Hypertension      Past Surgical History   Procedure Laterality Date   • Back surgery       herniated disc      Social History     Occupational History   • Not on file.     Social History Main Topics   • Smoking status: Never Smoker   • Smokeless tobacco: Not on file   • Alcohol use Yes   • Drug use: No   • Sexual activity: Defer      Social History     Social History Narrative     History reviewed. No pertinent family history.    Review of Systems:      Constitutional: Denies fever, shaking or chills   Eyes: Denies change in visual acuity   HEENT: Denies nasal congestion or sore throat   Respiratory: Denies cough or shortness of breath   Cardiovascular: Denies chest pain or edema  Endocrine: Denies tremors, palpitations, intolerance of heat or cold, polyuria, polydipsia.  GI: Denies abdominal pain, nausea, vomiting, bloody stools or diarrhea  : Denies frequency, urgency, incontinence, retention, or nocturia.  Musculoskeletal: Denies numbness tingling or  "loss of motor function except as above  Integument: Denies rash, lesion or ulceration   Neurologic: Denies headache or focal weakness, deficits  Heme: Denies epistaxis, spontaneous or excessive bleeding, epistaxis, hematuria, melena, fatigue, enlarged or tender lymph nodes.      All other pertinent positives and negatives as noted above in HPI.    Physical Exam: 51 y.o. male  Vitals:    01/11/17 0802   Temp: 97.4 °F (36.3 °C)   Weight: 225 lb (102 kg)   Height: 73\" (185.4 cm)     General:  Patient is awake and alert.  Appears in no acute distress or discomfort.    Psych:  Affect and demeanor are appropriate.    Eyes:  Conjunctiva and sclera appear grossly normal.  Eyes track well and EOM seem to be intact.    Ears:  No gross abnormalities.  Hearing adequate for the exam.    Cardiovascular:  Regular rate and rhythm.    Lungs:  Good chest expansion.  Breathing unlabored.    Extremities:  Skin over posterior aspect of right elbow is without skin breaks, lesions, or ulcerations.  Has significant swelling over back of olecranon along with associated overlying erythema.  There is significant tenderness over the bursa along with increased warmth.  He does not seem to have any tenderness over the elbow joint itself.  Overall, elbow motion is well tolerated and his pain is in the bursa not really the joint.  Range of motion is from 15° shy of full extension to 120° of flexion.  Full pronation and supination.  No instability evident.  Good strength with elbow flexion, extension, pronation, supination.  Intact sensation distally.  Good radial pulse.  Brisk cap refill.    Radiology:  Outside AP and lateral views of the right elbow are ordered by myself and reviewed to evaluate the patient's complaint.  I have also reviewed the associated report.  No comparison films are immediately available.  The x-rays show no obvious acute abnormalities, lesions, masses, significant degenerative changes.  There is a osteophyte emanating from " the posterior aspect of the olecranon along with significant soft tissue edema.    Assessment/Plan:   Right elbow infected olecranon bursitis, overlying cellulitis    I explained to the patient the fact that I think he has an infection.  It appears that it is isolated to the bursa and I see no evidence for septic arthritis.  Given that he has plateaued with the antibiotics and continues to have significant pain, swelling, and erythema, I feel that an aspiration is warranted to confirm the diagnosis.  If it does appear infectious then I think we need to give strong consideration to an open irrigation and debridement, temporary intravenous antibiotics and then gradual transition back to oral antibiotics.  I explained the risks, benefits, and alternative aspiration.  He consented to proceed as described below.    Aspiration of the olecranon bursa demonstrated gross pus.  There was not a sufficient quantity of fluid to send that for a full analysis; however, given what appears to be obvious evidence for infection, I recommended irrigation and debridement of the olecranon bursa.  We thoroughly discussed the risks, benefits, and alternatives.  Specifically, we discussed the risk of recurrent infection, wound healing problems, iatrogenic injury to nearby nerves which could result in permanent weakness or numbness, DVT, PE, positioning related neuropraxia, RSD, and anesthesia related complications potentially resulting in death.  I did explain to him that my own personal experience has been that wound healing problems tends to be the biggest issue with this particular procedure and he understands this.  We'll plan to proceed with irrigation and debridement tomorrow.  We will have him continue his Augmentin in the meantime.    Medium Joint Arthrocentesis  Date/Time: 1/11/2017 9:12 AM  Consent given by: patient  Site marked: site marked  Timeout: Immediately prior to procedure a time out was called to verify the correct  patient, procedure, equipment, support staff and site/side marked as required   Supporting Documentation  Indications: pain, joint swelling and diagnostic evaluation   Procedure Details  Preparation: Patient was prepped and draped in the usual sterile fashion  Needle size: 25 G  Approach: posterior  Aspirate amount: 5 mL  Aspirate: bloody and purulent  Analysis: fluid sample sent for laboratory analysis  Patient tolerance: patient tolerated the procedure well with no immediate complications            Jovany Cavazos MD    01/11/2017    CC to JOE Solorzano     Electronically signed by Jovany Cavazos MD at 1/11/2017  9:16 AM      Jovany Cavazos MD at 1/12/2017  6:51 AM          H&P reviewed. The patient was examined and there are no changes to the H&P.     Electronically signed by Jovany Cavazos MD at 1/12/2017  6:51 AM        Hospital Medications (active)       Dose Frequency Start End    acetaminophen (TYLENOL) tablet 325 mg 325 mg Every 4 Hours PRN 1/12/2017     Sig - Route: Take 1 tablet by mouth Every 4 (Four) Hours As Needed for fever (greater than 101 F). - Oral    acetaminophen (TYLENOL) tablet 650 mg 650 mg Every 4 Hours PRN 1/12/2017     Sig - Route: Take 2 tablets by mouth Every 4 (Four) Hours As Needed for mild pain (1-3). - Oral    atorvastatin (LIPITOR) tablet 20 mg 20 mg Daily 1/12/2017     Sig - Route: Take 1 tablet by mouth Daily. - Oral    bisacodyl (DULCOLAX) suppository 10 mg 10 mg Daily PRN 1/12/2017     Sig - Route: Insert 1 suppository into the rectum Daily As Needed for constipation. - Rectal    ceFAZolin in dextrose (ANCEF) IVPB solution 2 g 2 g Once 1/12/2017 1/12/2017    Sig - Route: Infuse 100 mL into a venous catheter 1 (One) Time. - Intravenous    diphenhydrAMINE (BENADRYL) capsule 25 mg 25 mg Every 6 Hours PRN 1/12/2017     Sig - Route: Take 1 capsule by mouth Every 6 (Six) Hours As Needed for itching. - Oral    diphenhydrAMINE (BENADRYL) injection 25 mg 25 mg Every 6  "Hours PRN 1/12/2017     Sig - Route: Infuse 0.5 mL into a venous catheter Every 6 (Six) Hours As Needed for itching. - Intravenous    docusate sodium (COLACE) capsule 100 mg 100 mg 2 Times Daily PRN 1/12/2017     Sig - Route: Take 1 capsule by mouth 2 (Two) Times a Day As Needed for constipation. - Oral    famotidine (PEPCID) injection 20 mg 20 mg Once 1/12/2017 1/12/2017    Sig - Route: Infuse 2 mL into a venous catheter 1 (One) Time. - Intravenous    HYDROmorphone (DILAUDID) injection 0.5 mg 0.5 mg Every 2 Hours PRN 1/12/2017 1/22/2017    Sig - Route: Infuse 0.5 mg into a venous catheter Every 2 (Two) Hours As Needed for severe pain (7-10). - Intravenous    Linked Group 1:  \"And\" Linked Group Details        lisinopril (PRINIVIL,ZESTRIL) tablet 20 mg 20 mg Daily 1/12/2017     Sig - Route: Take 1 tablet by mouth Daily. - Oral    mupirocin (BACTROBAN) 2 % nasal ointment  2 Times Daily 1/12/2017 1/14/2017    Sig - Route: by Each Nare route 2 (Two) Times a Day. - Each Nare    naloxone (NARCAN) injection 0.1 mg 0.1 mg Every 5 Minutes PRN 1/12/2017     Sig - Route: Infuse 0.25 mL into a venous catheter Every 5 (Five) Minutes As Needed for respiratory depression. - Intravenous    Linked Group 1:  \"And\" Linked Group Details        ondansetron (ZOFRAN) injection 4 mg 4 mg Every 6 Hours PRN 1/12/2017     Sig - Route: Infuse 2 mL into a venous catheter Every 6 (Six) Hours As Needed for nausea or vomiting. - Intravenous    ondansetron (ZOFRAN) tablet 4 mg 4 mg Every 6 Hours PRN 1/12/2017     Sig - Route: Take 1 tablet by mouth Every 6 (Six) Hours As Needed for nausea or vomiting. - Oral    oxyCODONE-acetaminophen (PERCOCET) 7.5-325 MG per tablet 2 tablet 2 tablet Every 4 Hours PRN 1/12/2017 1/22/2017    Sig - Route: Take 2 tablets by mouth Every 4 (Four) Hours As Needed for moderate pain (4-6). - Oral    Pharmacy to dose vancomycin  Continuous 1/12/2017     Sig - Route: Continuous. - Does not apply    sodium chloride 0.9 % " infusion 100 mL/hr Continuous 1/12/2017     Sig - Route: Infuse 100 mL/hr into a venous catheter Continuous. - Intravenous    vancomycin 1500 mg/500 mL 0.9% NS IVPB ([facility]) 1,500 mg Every 12 Hours 1/13/2017     Sig - Route: Infuse 500 mL into a venous catheter Every 12 (Twelve) Hours. - Intravenous    vancomycin 2000 mg/500 mL 0.9% NS IVPB ([facility]) 2,000 mg Once 1/12/2017 1/12/2017    Sig - Route: Infuse 500 mL into a venous catheter 1 (One) Time. - Intravenous    verapamil SR (CALAN-SR) CR tablet 180 mg 180 mg Every Morning 1/13/2017     Sig - Route: Take 1 tablet by mouth Every Morning. - Oral    diphenhydrAMINE (BENADRYL) injection 12.5 mg (Discontinued) 12.5 mg Every 15 Minutes PRN 1/12/2017 1/12/2017    Sig - Route: Infuse 0.25 mL into a venous catheter Every 15 (Fifteen) Minutes As Needed for itching (May repeat x 1). - Intravenous    Reason for Discontinue: Patient Transfer    ePHEDrine injection 5 mg (Discontinued) 5 mg Once As Needed 1/12/2017 1/12/2017    Sig - Route: Infuse 0.1 mL into a venous catheter 1 (One) Time As Needed (symptomatic hypotension - Notify attending anesthesiologist). - Intravenous    Reason for Discontinue: Patient Transfer    ePHEDrine injection (Discontinued)  As Needed 1/12/2017 1/12/2017    Sig - Route: Infuse  into a venous catheter As Needed. - Intravenous    Reason for Discontinue: Anesthesia Stop    FentaNYL Citrate (PF) (SUBLIMAZE) injection 50 mcg (Discontinued) 50 mcg Every 5 Minutes PRN 1/12/2017 1/12/2017    Sig - Route: Infuse 1 mL into a venous catheter Every 5 (Five) Minutes As Needed for moderate pain (4-6) or severe pain (7-10). - Intravenous    Reason for Discontinue: Patient Transfer    FentaNYL Citrate (PF) (SUBLIMAZE) injection (Discontinued)  As Needed 1/12/2017 1/12/2017    Sig: As Needed for severe pain (7-10).    Reason for Discontinue: Anesthesia Stop    glycopyrrolate (ROBINUL) injection (Discontinued)  As Needed 1/12/2017 1/12/2017    Sig - Route: Infuse  into  a venous catheter As Needed for excess secretions. - Intravenous    Reason for Discontinue: Anesthesia Stop    hydrALAZINE (APRESOLINE) injection 5 mg (Discontinued) 5 mg Every 10 Minutes PRN 1/12/2017 1/12/2017    Sig - Route: Infuse 0.25 mL into a venous catheter Every 10 (Ten) Minutes As Needed for high blood pressure (for systolic blood pressure greater than 180 mmHg or diastolic blood pressure greater than 105 mmHg). - Intravenous    Reason for Discontinue: Patient Transfer    HYDROcodone-acetaminophen (NORCO) 7.5-325 MG per tablet 1 tablet (Discontinued) 1 tablet Once As Needed 1/12/2017 1/12/2017    Sig - Route: Take 1 tablet by mouth 1 (One) Time As Needed for moderate pain (4-6). - Oral    Reason for Discontinue: Patient Transfer    HYDROmorphone (DILAUDID) injection 0.25 mg (Discontinued) 0.25 mg Every 5 Minutes PRN 1/12/2017 1/12/2017    Sig - Route: Infuse 0.25 mg into a venous catheter Every 5 (Five) Minutes As Needed for moderate pain (4-6). - Intravenous    Reason for Discontinue: Patient Transfer    HYDROmorphone (DILAUDID) injection 0.5 mg (Discontinued) 0.5 mg Every 5 Minutes PRN 1/12/2017 1/12/2017    Sig - Route: Infuse 0.5 mg into a venous catheter Every 5 (Five) Minutes As Needed for moderate pain (4-6) or severe pain (7-10). - Intravenous    Reason for Discontinue: Patient Transfer    labetalol (NORMODYNE,TRANDATE) injection 5 mg (Discontinued) 5 mg Every 5 Minutes PRN 1/12/2017 1/12/2017    Sig - Route: Infuse 1 mL into a venous catheter Every 5 (Five) Minutes As Needed for high blood pressure (for systolic blood pressure greater than 180 mmHg or diastolic blood pressure greater than 105 mmHg). - Intravenous    Reason for Discontinue: Patient Transfer    lactated ringers infusion (Discontinued) 9 mL/hr Continuous PRN 1/12/2017 1/12/2017    Sig - Route: Infuse 9 mL/hr into a venous catheter Continuous As Needed (Start Prior to Surgery). - Intravenous    Reason for Discontinue: Patient Transfer  "   lidocaine (XYLOCAINE) 2 % injection (Discontinued)  As Needed 1/12/2017 1/12/2017    Sig: As Needed.    Reason for Discontinue: Anesthesia Stop    midazolam (VERSED) injection 1 mg (Discontinued) 1 mg Every 5 Minutes PRN 1/12/2017 1/12/2017    Sig - Route: Infuse 1 mL into a venous catheter Every 5 (Five) Minutes As Needed for anxiety (Max 4mg midazolam TOTAL). - Intravenous    Reason for Discontinue: Patient Transfer    Linked Group 2:  \"Or\" Linked Group Details        midazolam (VERSED) injection 2 mg (Discontinued) 2 mg Every 5 Minutes PRN 1/12/2017 1/12/2017    Sig - Route: Infuse 2 mL into a venous catheter Every 5 (Five) Minutes As Needed for anxiety (Max 4mg midazolam TOTAL). - Intravenous    Reason for Discontinue: Patient Transfer    Linked Group 2:  \"Or\" Linked Group Details        naloxone (NARCAN) injection 0.2 mg (Discontinued) 0.2 mg As Needed 1/12/2017 1/12/2017    Sig - Route: Infuse 0.5 mL into a venous catheter As Needed for opioid reversal or respiratory depression (unresponsiveness, decrease oxygen saturation). - Intravenous    Reason for Discontinue: Patient Transfer    ondansetron (ZOFRAN) injection 4 mg (Discontinued) 4 mg Once As Needed 1/12/2017 1/12/2017    Sig - Route: Infuse 2 mL into a venous catheter 1 (One) Time As Needed for nausea or vomiting. - Intravenous    Reason for Discontinue: Patient Transfer    ondansetron (ZOFRAN) injection (Discontinued)  As Needed 1/12/2017 1/12/2017    Sig - Route: Infuse  into a venous catheter As Needed for nausea or vomiting. - Intravenous    Reason for Discontinue: Anesthesia Stop    oxyCODONE-acetaminophen (PERCOCET) 7.5-325 MG per tablet 1 tablet (Discontinued) 1 tablet Once As Needed 1/12/2017 1/12/2017    Sig - Route: Take 1 tablet by mouth 1 (One) Time As Needed for moderate pain (4-6). - Oral    Reason for Discontinue: Patient Transfer    phenylephrine (JAMES-SYNEPHRINE) injection (Discontinued)  As Needed 1/12/2017 1/12/2017    Sig - Route: " "Infuse  into a venous catheter As Needed. - Intravenous    Reason for Discontinue: Anesthesia Stop    promethazine (PHENERGAN) injection 12.5 mg (Discontinued) 12.5 mg Once As Needed 1/12/2017 1/12/2017    Sig - Route: Infuse 0.5 mL into a venous catheter 1 (One) Time As Needed for nausea or vomiting. - Intravenous    Reason for Discontinue: Patient Transfer    Linked Group 3:  \"Or\" Linked Group Details        promethazine (PHENERGAN) injection 12.5 mg (Discontinued) 12.5 mg Once As Needed 1/12/2017 1/12/2017    Sig - Route: Inject 0.5 mL into the shoulder, thigh, or buttocks 1 (One) Time As Needed for nausea or vomiting. - Intramuscular    Reason for Discontinue: Patient Transfer    Linked Group 3:  \"Or\" Linked Group Details        promethazine (PHENERGAN) suppository 25 mg (Discontinued) 25 mg Once As Needed 1/12/2017 1/12/2017    Sig - Route: Insert 1 suppository into the rectum 1 (One) Time As Needed for nausea or vomiting. - Rectal    Reason for Discontinue: Patient Transfer    Linked Group 3:  \"Or\" Linked Group Details        promethazine (PHENERGAN) tablet 12.5 mg (Discontinued) 12.5 mg Once As Needed 1/12/2017 1/12/2017    Sig - Route: Take 0.5 tablets by mouth 1 (One) Time As Needed for nausea or vomiting. - Oral    Reason for Discontinue: Patient Transfer    promethazine (PHENERGAN) tablet 25 mg (Discontinued) 25 mg Once As Needed 1/12/2017 1/12/2017    Sig - Route: Take 1 tablet by mouth 1 (One) Time As Needed for nausea or vomiting. - Oral    Reason for Discontinue: Patient Transfer    Linked Group 3:  \"Or\" Linked Group Details        Propofol (DIPRIVAN) injection (Discontinued)  As Needed 1/12/2017 1/12/2017    Sig - Route: Infuse  into a venous catheter As Needed. - Intravenous    Reason for Discontinue: Anesthesia Stop    sodium chloride (NS) irrigation solution (Discontinued)  As Needed 1/12/2017 1/12/2017    Sig: As Needed.    Reason for Discontinue: Patient Discharge    sodium chloride 0.9 % flush " 1-10 mL (Discontinued) 1-10 mL As Needed 1/12/2017 1/12/2017    Sig - Route: Infuse 1-10 mL into a venous catheter As Needed for line care. - Intravenous    Reason for Discontinue: Patient Transfer             Operative/Procedure Notes      Jovany Cavazos MD at 1/12/2017  3:13 PM  Version 1 of 1         INCISION AND DRAINAGE UPPER EXTREMITY  Procedure Note    Ochoa Mojica  1/12/2017    Pre-op Diagnosis:   Olecranon bursitis of right elbow [M70.21]    Post-op Diagnosis:     Post-Op Diagnosis Codes:     * Olecranon bursitis of right elbow [M70.21]    Procedure/CPT® Codes:      Procedure(s):  INCISION / DEBRIDEMENT BONE ELBOW    Surgeon(s):  Jovany Cavazos MD    Anesthesia: General    Staff:   Circulator: Thi Garcia RN  Scrub Person: Shila Briggs    Estimated Blood Loss: * No values recorded between 1/12/2017  2:54 PM and 1/12/2017  3:13 PM *    Specimens:                * No specimens in log *      Drains:           Findings: see dictation    Complications: none      Jovany Cavazos MD     Date: 1/12/2017  Time: 3:13 PM         Electronically signed by Jovany Cavazos MD at 1/12/2017  3:34 PM           Physician Progress Notes       Jovany Cavazos MD at 1/13/2017  7:28 AM  Version 1 of 1         I have seen and evaluated the patient this morning.  He seems to be doing well postoperative day 1.  The dressings are clean and intact.  His arm and forearm are soft.  He has normal motor and sensory function in his hand.  Vital signs are stable.  Cultures are still pending.  Plan is to continue the vancomycin for 2 more doses and then transition him back to oral antibiotics this afternoon.  Hopefully he can be discharged this afternoon.     Electronically signed by Jovany Cavazos MD at 1/13/2017  7:29 AM

## 2017-01-13 NOTE — PROGRESS NOTES
"Pharmacokinetic Consult - Vancomycin Dosing (Follow-up Note)    Ochoa Mojica is on day #2  pharmacy to dose vancomycin for Septic bursitis of elbow with Incision and debridement on 1/12  Pharmacy dosing vancomycin per Dr Cavazos's request.   Goal trough: 15-20 mg/L     Relevant clinical data and objective history reviewed:  51 y.o. male 73\" (185.4 cm) 234 lb (106 kg)    Past Medical History   Diagnosis Date   • Bursitis      RIGHT ELBOW   • Hyperlipidemia    • Hypertension    • Strep throat 11/2016     CREATININE   Date Value Ref Range Status   01/11/2017 0.81 0.76 - 1.27 mg/dL Final     BUN   Date Value Ref Range Status   01/11/2017 20 6 - 20 mg/dL Final     Estimated Creatinine Clearance: 134.7 mL/min (by C-G formula based on Cr of 0.81).    Lab Results   Component Value Date    WBC 8.47 01/11/2017     Temp Readings from Last 3 Encounters:   01/13/17 97.5 °F (36.4 °C) (Oral)   01/11/17 97.4 °F (36.3 °C)   01/05/17 99.5 °F (37.5 °C) (Oral)     Baseline culture/source/susceptibility:   Procedure Component Value - Date/Time       Anaerobic Culture [[id number]] Collected: 01/12/17 1524     Lab Status: In process Specimen: Wound from Elbow, Right Updated: 01/12/17 1551     Wound Culture [[id number]] (Normal) Collected: 01/12/17 1524     Lab Status: Preliminary result Specimen: Wound from Elbow, Right Updated: 01/13/17 0731      Wound Culture No growth at less than 24 hours       Gram Stain Result Rare (1+) WBCs seen        No organisms seen      Anaerobic Culture [[id number]] Collected: 01/12/17 1523     Lab Status: In process Specimen: Wound from Elbow, Right Updated: 01/12/17 1551     Wound Culture [[id number]] (Normal) Collected: 01/12/17 1523     Lab Status: Preliminary result Specimen: Wound from Elbow, Right Updated: 01/13/17 0731      Wound Culture No growth at less than 24 hours       Gram Stain Result Rare (1+) WBCs seen        No organisms seen      Wound Culture [[id number]] (Normal) Collected: 01/11/17 0934     " Lab Status: Preliminary result Specimen: Wound from Elbow, Right Updated: 01/12/17 0847      Wound Culture No growth at 24 hours       Gram Stain Result No WBCs or organisms seen      Anaerobic Culture [[id number]] Collected: 01/11/17 0934     Lab Status: In process Specimen: Aspirate from Elbow, Right Updated: 01/11/17 0934            IV Anti-Infectives     Ordered     Dose/Rate Route Frequency Start Stop    01/12/17 1815  vancomycin 1500 mg/500 mL 0.9% NS IVPB ([facility])     Ordering Provider:  Jovany Cavazos MD    1,500 mg Intravenous Every 12 Hours 01/13/17 0700      01/12/17 1815  vancomycin 2000 mg/500 mL 0.9% NS IVPB ([facility])     Ordering Provider:  Jovany Cavazos MD    2,000 mg Intravenous Once 01/12/17 1900 01/12/17 2025    01/12/17 1801  Pharmacy to dose vancomycin     Ordering Provider:  Jovany Cavazos MD     Does not apply Continuous 01/12/17 1845      01/12/17 1042  ceFAZolin in dextrose (ANCEF) IVPB solution 2 g     Ordering Provider:  Jovany Cavazos MD    2 g Intravenous Once 01/12/17 1115 01/12/17 1454             Assessment/Plan  Continue Vancomycin 2 grams iv q 12 hours. Vancomycin trough prior to 0700 dose on 1/14/17.  Pharmacy will continue to follow daily while on vancomycin and adjust as needed.              Note from Ortho: Plan is to continue the vancomycin for 2 more doses and then transition him back to oral antibiotics this afternoon. Hopefully he can be discharged this afternoon    Rebecca Pearce [facility]

## 2017-01-13 NOTE — PROGRESS NOTES
I have seen and evaluated the patient this morning.  He seems to be doing well postoperative day 1.  The dressings are clean and intact.  His arm and forearm are soft.  He has normal motor and sensory function in his hand.  Vital signs are stable.  Cultures are still pending.  Plan is to continue the vancomycin for 2 more doses and then transition him back to oral antibiotics this afternoon.  Hopefully he can be discharged this afternoon.

## 2017-01-14 LAB
BACTERIA SPEC AEROBE CULT: NORMAL
BACTERIA SPEC AEROBE CULT: NORMAL
GRAM STN SPEC: NORMAL

## 2017-01-14 NOTE — DISCHARGE SUMMARY
Date of Admission:  1/12/2017    Date of Discharge:  1/14/2017    Discharge Diagnosis: s/p I and D right elbow    Admitting Physician: Jovany Cavazos    Consults: none    DETAILS OF HOSPITAL STAY:  Patient is a 51 y.o. male was admitted to the floor following the above procedure.  Post-operatively the patient was transferred to the post-operative floor where the patient underwent limited physical therapy. Opioids were titrated to achieve appropriate pain management to allow for participation in mobilization exercises. Vital signs are now stable. The incision is benign without signs or symptoms of infection. Operative extremity neurovascular status remains intact. Appropriate education re: incision care, activity levels, medications, and follow up visits was completed and all questions were answered. The patient is now deemed stable for discharge to home.    Condition on Discharge:  Stable    Discharge Medications   Ochoa Mojica   Home Medication Instructions CARLOS:[id number]    Printed on:01/14/17 0229   Medication Information                      atorvastatin (LIPITOR) 20 MG tablet  Take 20 mg by mouth Daily.             doxycycline (DORYX) 100 MG enteric coated tablet  Take 1 tablet by mouth 2 (Two) Times a Day for 10 days.             lisinopril (PRINIVIL,ZESTRIL) 20 MG tablet  Take 20 mg by mouth Daily.             oxyCODONE-acetaminophen (PERCOCET) 7.5-325 MG per tablet  Take 1-2 tabs po q 4 hours prn pain             verapamil SR (CALAN-SR) 180 MG CR tablet  Take 180 mg by mouth Every Morning.                 Discharge Diet: regular    Activity at Discharge: as tolerated    Discharge Instructions:   1)  Patient is to continue with home physical therapy exercises daily.  2)  Continue to follow precautions as instructed.   3)  Patient is instructed to continue antibiotics as directed  4)  Follow up appointment in 2 weeks - patient to call the office at [phone] to schedule.     Follow-up Appointments  2  weeks with Dr Dirk Cavazos MD  01/14/17  10:49 AM

## 2017-01-17 ENCOUNTER — TELEPHONE (OUTPATIENT)
Dept: ORTHOPEDIC SURGERY | Facility: CLINIC | Age: 52
End: 2017-01-17

## 2017-01-17 LAB
BACTERIA SPEC ANAEROBE CULT: NORMAL
BACTERIA SPEC ANAEROBE CULT: NORMAL

## 2017-01-30 ENCOUNTER — OFFICE VISIT (OUTPATIENT)
Dept: ORTHOPEDIC SURGERY | Facility: CLINIC | Age: 52
End: 2017-01-30

## 2017-01-30 VITALS — TEMPERATURE: 98.3 F | WEIGHT: 230 LBS | BODY MASS INDEX: 30.48 KG/M2 | HEIGHT: 73 IN

## 2017-01-30 DIAGNOSIS — Z09 SURGERY FOLLOW-UP: Primary | ICD-10-CM

## 2017-01-30 PROCEDURE — 99024 POSTOP FOLLOW-UP VISIT: CPT | Performed by: ORTHOPAEDIC SURGERY

## 2017-01-30 NOTE — MR AVS SNAPSHOT
Ochoa Mojica   2017 3:45 PM   Office Visit    Dept Phone:  [phone]   Encounter #:  [id number]    Provider:  Jovany Cavazos MD   Department:  [facility]                Your Full Care Plan              Your Updated Medication List          This list is accurate as of: 17  4:36 PM.  Always use your most recent med list.                atorvastatin 20 MG tablet   Commonly known as:  LIPITOR       lisinopril 20 MG tablet   Commonly known as:  PRINIVIL,ZESTRIL       oxyCODONE-acetaminophen 7.5-325 MG per tablet   Commonly known as:  PERCOCET   Take 1-2 tabs po q 4 hours prn pain       verapamil  MG CR tablet   Commonly known as:  CALAN-SR               Instructions     None    Patient Instructions History      Upcoming Appointments     Visit Type Date Time Department    POST-OP 2017  3:45 PM [facility]      [employer] Signup     [facility] [employer] allows you to send messages to your doctor, view your test results, renew your prescriptions, schedule appointments, and more. To sign up, go to [employer] and click on the Sign Up Now link in the New User? box. Enter your [employer] Activation Code exactly as it appears below along with the last four digits of your Social Security Number and your Date of Birth () to complete the sign-up process. If you do not sign up before the expiration date, you must request a new code.    [employer] Activation Code: [email or web address]  Expires: 2017  5:35 AM    If you have questions, you can email [email or web address] or call [phone] to talk to our [employer] staff. Remember, [employer] is NOT to be used for urgent needs. For medical emergencies, dial 911.               Other Info from Your Visit           Allergies     No Known Allergies      Reason for Visit     Right Elbow - Post-op           Vital Signs     Temperature Height Weight Body Mass Index  "Smoking Status       98.3 °F (36.8 °C) 73\" (185.4 cm) 230 lb (104 kg) 30.34 kg/m2 Never Smoker         "

## 2017-01-31 NOTE — PROGRESS NOTES
Mr. Mojica comes in today for follow-up of the right elbow.  He tells me it is doing great.  Pain is nearly resolved.  Redness and swelling are completely gone.  He has been off of the antibiotics for several days now.    His wound is healed.  There is no drainage or erythema.  Elbow motion is full.  Neurovascular exam is normal in his hand.    Assessment is 2 weeks status post irrigation and debridement of septic right olecranon bursitis    Plan was discussed with the patient.  He is doing great.  He can follow-up with me as needed.

## 2017-02-24 ENCOUNTER — HOSPITAL ENCOUNTER (OUTPATIENT)
Dept: GENERAL RADIOLOGY | Facility: HOSPITAL | Age: 52
Discharge: HOME | End: 2017-02-24
Admitting: PHYSICIAN ASSISTANT

## 2017-02-24 ENCOUNTER — TRANSCRIBE ORDERS (OUTPATIENT)
Dept: ADMINISTRATIVE | Facility: HOSPITAL | Age: 52
End: 2017-02-24

## 2017-02-24 DIAGNOSIS — R06.02 SOB (SHORTNESS OF BREATH): ICD-10-CM

## 2017-02-24 DIAGNOSIS — R05.9 COUGH: ICD-10-CM

## 2017-02-24 DIAGNOSIS — R05.9 COUGH: Primary | ICD-10-CM

## 2017-02-24 PROCEDURE — 71020 HC CHEST PA AND LATERAL: CPT

## 2018-01-16 ENCOUNTER — TRANSCRIBE ORDERS (OUTPATIENT)
Dept: ADMINISTRATIVE | Facility: HOSPITAL | Age: 53
End: 2018-01-16

## 2018-01-16 DIAGNOSIS — R79.89 LIVER FUNCTION TEST ABNORMALITY: Primary | ICD-10-CM

## 2018-01-29 ENCOUNTER — APPOINTMENT (OUTPATIENT)
Dept: ULTRASOUND IMAGING | Facility: HOSPITAL | Age: 53
End: 2018-01-29

## 2018-02-09 ENCOUNTER — HOSPITAL ENCOUNTER (OUTPATIENT)
Dept: ULTRASOUND IMAGING | Facility: HOSPITAL | Age: 53
Discharge: HOME | End: 2018-02-09
Admitting: FAMILY MEDICINE

## 2018-02-09 DIAGNOSIS — R79.89 LIVER FUNCTION TEST ABNORMALITY: ICD-10-CM

## 2018-02-09 PROCEDURE — 76705 ECHO EXAM OF ABDOMEN: CPT

## 2019-10-01 ENCOUNTER — OFFICE VISIT (OUTPATIENT)
Dept: NEUROSURGERY | Facility: CLINIC | Age: 54
End: 2019-10-01

## 2019-10-01 VITALS
BODY MASS INDEX: 27.71 KG/M2 | DIASTOLIC BLOOD PRESSURE: 80 MMHG | WEIGHT: 210 LBS | SYSTOLIC BLOOD PRESSURE: 106 MMHG | HEART RATE: 72 BPM

## 2019-10-01 DIAGNOSIS — Z98.890 HISTORY OF LUMBAR SURGERY: Primary | ICD-10-CM

## 2019-10-01 DIAGNOSIS — R20.0 NUMBNESS AND TINGLING OF FOOT: ICD-10-CM

## 2019-10-01 DIAGNOSIS — R20.2 NUMBNESS AND TINGLING OF FOOT: ICD-10-CM

## 2019-10-01 PROCEDURE — 99204 OFFICE O/P NEW MOD 45 MIN: CPT | Performed by: NURSE PRACTITIONER

## 2019-10-01 NOTE — PROGRESS NOTES
Subjective   History of Present Illness: Ochoa Mojica is a 54 y.o. male is here today for follow-up for bilateral foot numbness.  This happened after a procedure regarding a retinal detachment.  A gas bubble was put in his eye and the numbness started after 7 days.  Patient has not had any imaging.     Patient has had 2 prior back surgeries with Dr Garza first in 2001, right L5/S1 lumbar discectomy bilateral with bilateral foraminotomy and in 2012, left L4/5 lumbar discectomy.    Patient was last seen 8.13.14 for post operative appt for residual low back discomfort.  He now only complains of bilateral foot numbness.  He denies any leg pain or back pain.    History of Present Illness    The following portions of the patient's history were reviewed and updated as appropriate: allergies, current medications, past family history, past medical history, past social history, past surgical history and problem list.    Review of Systems   Constitutional: Negative for activity change.   Eyes: Positive for visual disturbance (retinal detachment).   Respiratory: Negative for shortness of breath.    Cardiovascular: Negative for chest pain.   Genitourinary: Negative for difficulty urinating.   Musculoskeletal: Negative for gait problem.   Skin: Negative for rash.   Neurological: Positive for numbness (bilateral feet). Negative for weakness.   All other systems reviewed and are negative.      Objective     Vitals:    10/01/19 1559   BP: 106/80   Pulse: 72   Weight: 95.3 kg (210 lb)     Body mass index is 27.71 kg/m².      Physical Exam   Constitutional: He is oriented to person, place, and time. He appears well-developed and well-nourished. No distress.   HENT:   Head: Normocephalic.   Eyes: Conjunctivae are normal.   Neck: Normal range of motion.   Cardiovascular: Normal pulses.   Pulmonary/Chest: Effort normal.   Musculoskeletal: He exhibits no edema.   Neurological: He is alert and oriented to person, place, and time. He  has normal strength. Gait normal.   Reflex Scores:       Patellar reflexes are 2+ on the right side and 2+ on the left side.       Achilles reflexes are 1+ on the right side and 2+ on the left side.  Skin: Skin is warm, dry and intact.   Psychiatric: He has a normal mood and affect. His behavior is normal.     Neurologic Exam     Mental Status   Oriented to person, place, and time.     Motor Exam   Muscle bulk: normal  Overall muscle tone: normal    Strength   Strength 5/5 throughout.     Sensory Exam   Light touch normal.     Gait, Coordination, and Reflexes     Gait  Gait: normal    Reflexes   Right patellar: 2+  Left patellar: 2+  Right achilles: 1+  Left achilles: 2+  Right Joseph: absent  Left Joseph: absent  Right ankle clonus: absent  Left ankle clonus: absent        Assessment/Plan   Independent Review of Radiographic Studies:      I personally reviewed the images from the following studies.    No new imaging    Medical Decision Making:      Given his history of 2 prior lumbar surgeries in the bilateral foot numbness we will investigate with a lumbar MRI with and without contrast. We will also get bilateral LE EMG/NCV to rule out nerve dysfunction such as peripheral neuropathy. He is going to contact his ophthalmologist to see if he can lie flat long enough for an MRI and if not that will be delayed until he can. He will follow up with the tests.     Ochoa was seen today for numbness.    Diagnoses and all orders for this visit:    History of lumbar surgery  -     MRI Lumbar Spine With & Without Contrast; Future    Numbness and tingling of foot  -     EMG & Nerve Conduction Test; Future  -     MRI Lumbar Spine With & Without Contrast; Future      Return for After radiographic tests with Dr Garza.

## 2019-10-15 ENCOUNTER — APPOINTMENT (OUTPATIENT)
Dept: MRI IMAGING | Facility: HOSPITAL | Age: 54
End: 2019-10-15

## 2019-10-15 ENCOUNTER — HOSPITAL ENCOUNTER (EMERGENCY)
Facility: HOSPITAL | Age: 54
Discharge: HOME | End: 2019-10-15
Attending: EMERGENCY MEDICINE | Admitting: EMERGENCY MEDICINE

## 2019-10-15 VITALS
OXYGEN SATURATION: 98 % | SYSTOLIC BLOOD PRESSURE: 125 MMHG | DIASTOLIC BLOOD PRESSURE: 94 MMHG | WEIGHT: 212 LBS | BODY MASS INDEX: 28.1 KG/M2 | HEART RATE: 82 BPM | TEMPERATURE: 96.5 F | RESPIRATION RATE: 18 BRPM | HEIGHT: 73 IN

## 2019-10-15 DIAGNOSIS — G47.9 TROUBLE IN SLEEPING: ICD-10-CM

## 2019-10-15 DIAGNOSIS — F41.9 ANXIETY: Primary | ICD-10-CM

## 2019-10-15 LAB
AMPHET+METHAMPHET UR QL: NEGATIVE
BARBITURATES UR QL SCN: NEGATIVE
BENZODIAZ UR QL SCN: NEGATIVE
CANNABINOIDS SERPL QL: NEGATIVE
COCAINE UR QL: NEGATIVE
ETHANOL BLD-MCNC: <10 MG/DL (ref 0–10)
ETHANOL UR QL: <0.01 %
METHADONE UR QL SCN: NEGATIVE
OPIATES UR QL: NEGATIVE
OXYCODONE UR QL SCN: NEGATIVE

## 2019-10-15 PROCEDURE — 80307 DRUG TEST PRSMV CHEM ANLYZR: CPT | Performed by: EMERGENCY MEDICINE

## 2019-10-15 PROCEDURE — 99283 EMERGENCY DEPT VISIT LOW MDM: CPT

## 2019-10-15 PROCEDURE — 90791 PSYCH DIAGNOSTIC EVALUATION: CPT

## 2019-10-15 RX ORDER — CLONAZEPAM 1 MG/1
1 TABLET ORAL NIGHTLY PRN
Qty: 5 TABLET | Refills: 0 | Status: SHIPPED | OUTPATIENT
Start: 2019-10-15 | End: 2019-10-17 | Stop reason: HOSPADM

## 2019-10-15 NOTE — CONSULTS
"Pt reports detached retina surgery in July that failed, had new surgery. In meantime has had to lie on 1 side that affected his back, now has numbness in toes.  Pt was released back to work by eye MD.  He is finding his job very difficult d/t vision issues.      Saw Dr Bolanos, PCP last week, from reading chart it appears pt  Was started on lexapro 10 mg, pt took 1st dose yesterday.  He is reporting very poor unrestful sleep x 1-2 wk.  Pt is s/w irritable, restless at times.  Circular thinking noted which is negative, catastrophizing.      No SI/HI.  Rates depression as a \"6\" on 1-10 scale.  Rates anxiety as a \"7 or 8\" on 1-10 scale.  Never had counseling, no inpt psych, has never seen a psychiatrist.  No CD issues- rare ETOH use.      Suggested psych IOP, he declined.  Pt will see a weekly counselor.  D/w Dr Soares, he agrees with discharge plan, was willing to give pt clonazepam 0.1 mg for sleep.  Encouraged pt to talk further with Dr Bolanos about his current situation.   "

## 2019-10-15 NOTE — DISCHARGE INSTRUCTIONS
Take medication as prescribed.  Follow-up with your primary care doctor.  Follow-up for outpatient counseling for your anxiety as recommended.  Return to emerge department for worsening symptoms.

## 2019-10-15 NOTE — ED NOTES
Pt to ED with c/o insomnia and anxiety intermittent x 1 week. Pt reports hx of detatched retina and had a gas bubble to his R eye.  Pt reports increased anxiety r/t thoughts vision issues. Pt reports a few hours of sleep a night the past week.  Pt denies HI/SI.     Diane Rod, RN  10/15/19 0155       Diane Rod RN  10/15/19 0155

## 2019-10-15 NOTE — ED NOTES
Pt had a gas bubble placed in his right eye after having retinal detachment surgery in September. Pt has been experiencing increased issues due to the issues with his right eye with sleeping. Pt reports he experienced what he considered to be an anxiety attack. He states he could not catch his breath and could not calm down around 0100 this am. Pt has been sleeping sporatically about 10-15 min intervals for the past week. He reports not deep sleep and it is starting to affect him. Pt reports he has been experiencing bilateral leg pain and numbness since having to lay on his back. Dr Le would like to do an MRI but he can't have it done now due to this procedure with his eye he had done.  Pt saw Dr Bolanos with in the past 2 weeks and it was not that bad so he mentioned it to him but did not feel it needed medication. He was just dealing with it. Pt denies taking any medications for sleep - OTC since this started. Pt reports he prescribed something for mild anxiety but he never took it. Pt is unsure of the name.     Brianne Rush, RN  10/15/19 0634

## 2019-10-16 ENCOUNTER — HOSPITAL ENCOUNTER (INPATIENT)
Facility: HOSPITAL | Age: 54
LOS: 1 days | Discharge: HOME | End: 2019-10-17
Attending: SPECIALIST | Admitting: SPECIALIST

## 2019-10-16 ENCOUNTER — HOSPITAL ENCOUNTER (EMERGENCY)
Facility: HOSPITAL | Age: 54
Discharge: PSYCHIATRIC OR SUBSTANCE USE FACILITY | End: 2019-10-16
Attending: EMERGENCY MEDICINE | Admitting: EMERGENCY MEDICINE

## 2019-10-16 VITALS
TEMPERATURE: 97.8 F | HEIGHT: 73 IN | SYSTOLIC BLOOD PRESSURE: 132 MMHG | OXYGEN SATURATION: 96 % | BODY MASS INDEX: 27.83 KG/M2 | HEART RATE: 82 BPM | RESPIRATION RATE: 16 BRPM | DIASTOLIC BLOOD PRESSURE: 82 MMHG | WEIGHT: 210 LBS

## 2019-10-16 DIAGNOSIS — F32.A DEPRESSION, UNSPECIFIED DEPRESSION TYPE: ICD-10-CM

## 2019-10-16 DIAGNOSIS — F41.9 ANXIETY: Primary | ICD-10-CM

## 2019-10-16 PROBLEM — F33.1 MAJOR DEPRESSIVE DISORDER, RECURRENT EPISODE, MODERATE (HCC): Status: ACTIVE | Noted: 2019-10-16

## 2019-10-16 LAB
ALBUMIN SERPL-MCNC: 3.9 G/DL (ref 3.5–5.2)
ALBUMIN/GLOB SERPL: 1.3 G/DL
ALP SERPL-CCNC: 66 U/L (ref 39–117)
ALT SERPL W P-5'-P-CCNC: 41 U/L (ref 1–41)
AMPHET+METHAMPHET UR QL: NEGATIVE
ANION GAP SERPL CALCULATED.3IONS-SCNC: 14.4 MMOL/L (ref 5–15)
AST SERPL-CCNC: 32 U/L (ref 1–40)
BARBITURATES UR QL SCN: NEGATIVE
BASOPHILS # BLD AUTO: 0.05 10*3/MM3 (ref 0–0.2)
BASOPHILS NFR BLD AUTO: 0.7 % (ref 0–1.5)
BENZODIAZ UR QL SCN: NEGATIVE
BILIRUB SERPL-MCNC: 0.8 MG/DL (ref 0.2–1.2)
BUN BLD-MCNC: 20 MG/DL (ref 6–20)
BUN/CREAT SERPL: 25 (ref 7–25)
CALCIUM SPEC-SCNC: 9.3 MG/DL (ref 8.6–10.5)
CANNABINOIDS SERPL QL: NEGATIVE
CHLORIDE SERPL-SCNC: 98 MMOL/L (ref 98–107)
CO2 SERPL-SCNC: 22.6 MMOL/L (ref 22–29)
COCAINE UR QL: NEGATIVE
CREAT BLD-MCNC: 0.8 MG/DL (ref 0.76–1.27)
DEPRECATED RDW RBC AUTO: 44.6 FL (ref 37–54)
EOSINOPHIL # BLD AUTO: 0.06 10*3/MM3 (ref 0–0.4)
EOSINOPHIL NFR BLD AUTO: 0.9 % (ref 0.3–6.2)
ERYTHROCYTE [DISTWIDTH] IN BLOOD BY AUTOMATED COUNT: 13 % (ref 12.3–15.4)
ETHANOL BLD-MCNC: <10 MG/DL (ref 0–10)
ETHANOL UR QL: <0.01 %
GFR SERPL CREATININE-BSD FRML MDRD: 100 ML/MIN/1.73
GLOBULIN UR ELPH-MCNC: 3.1 GM/DL
GLUCOSE BLD-MCNC: 200 MG/DL (ref 65–99)
HCT VFR BLD AUTO: 45.1 % (ref 37.5–51)
HGB BLD-MCNC: 15.3 G/DL (ref 13–17.7)
IMM GRANULOCYTES # BLD AUTO: 0.02 10*3/MM3 (ref 0–0.05)
IMM GRANULOCYTES NFR BLD AUTO: 0.3 % (ref 0–0.5)
LYMPHOCYTES # BLD AUTO: 1.78 10*3/MM3 (ref 0.7–3.1)
LYMPHOCYTES NFR BLD AUTO: 25.5 % (ref 19.6–45.3)
MCH RBC QN AUTO: 31.8 PG (ref 26.6–33)
MCHC RBC AUTO-ENTMCNC: 33.9 G/DL (ref 31.5–35.7)
MCV RBC AUTO: 93.8 FL (ref 79–97)
METHADONE UR QL SCN: NEGATIVE
MONOCYTES # BLD AUTO: 0.62 10*3/MM3 (ref 0.1–0.9)
MONOCYTES NFR BLD AUTO: 8.9 % (ref 5–12)
NEUTROPHILS # BLD AUTO: 4.46 10*3/MM3 (ref 1.7–7)
NEUTROPHILS NFR BLD AUTO: 63.7 % (ref 42.7–76)
NRBC BLD AUTO-RTO: 0 /100 WBC (ref 0–0.2)
OPIATES UR QL: NEGATIVE
OXYCODONE UR QL SCN: NEGATIVE
PLATELET # BLD AUTO: 290 10*3/MM3 (ref 140–450)
PMV BLD AUTO: 9.8 FL (ref 6–12)
POTASSIUM BLD-SCNC: 3.5 MMOL/L (ref 3.5–5.2)
PROT SERPL-MCNC: 7 G/DL (ref 6–8.5)
RBC # BLD AUTO: 4.81 10*6/MM3 (ref 4.14–5.8)
SODIUM BLD-SCNC: 135 MMOL/L (ref 136–145)
T4 FREE SERPL-MCNC: 1.84 NG/DL (ref 0.93–1.7)
TSH SERPL DL<=0.05 MIU/L-ACNC: 1.95 UIU/ML (ref 0.27–4.2)
WBC NRBC COR # BLD: 6.99 10*3/MM3 (ref 3.4–10.8)

## 2019-10-16 PROCEDURE — 85025 COMPLETE CBC W/AUTO DIFF WBC: CPT | Performed by: SPECIALIST

## 2019-10-16 PROCEDURE — 84439 ASSAY OF FREE THYROXINE: CPT | Performed by: SPECIALIST

## 2019-10-16 PROCEDURE — 80307 DRUG TEST PRSMV CHEM ANLYZR: CPT | Performed by: EMERGENCY MEDICINE

## 2019-10-16 PROCEDURE — 83036 HEMOGLOBIN GLYCOSYLATED A1C: CPT | Performed by: NURSE PRACTITIONER

## 2019-10-16 PROCEDURE — 90791 PSYCH DIAGNOSTIC EVALUATION: CPT | Performed by: SOCIAL WORKER

## 2019-10-16 PROCEDURE — 80053 COMPREHEN METABOLIC PANEL: CPT | Performed by: SPECIALIST

## 2019-10-16 PROCEDURE — 99284 EMERGENCY DEPT VISIT MOD MDM: CPT

## 2019-10-16 PROCEDURE — 84443 ASSAY THYROID STIM HORMONE: CPT | Performed by: SPECIALIST

## 2019-10-16 RX ORDER — ACETAMINOPHEN 325 MG/1
650 TABLET ORAL EVERY 4 HOURS PRN
Status: DISCONTINUED | OUTPATIENT
Start: 2019-10-16 | End: 2019-10-17 | Stop reason: HOSPADM

## 2019-10-16 RX ORDER — LISINOPRIL 20 MG/1
20 TABLET ORAL
Status: DISCONTINUED | OUTPATIENT
Start: 2019-10-17 | End: 2019-10-17 | Stop reason: HOSPADM

## 2019-10-16 RX ORDER — ALUMINA, MAGNESIA, AND SIMETHICONE 2400; 2400; 240 MG/30ML; MG/30ML; MG/30ML
15 SUSPENSION ORAL EVERY 6 HOURS PRN
Status: DISCONTINUED | OUTPATIENT
Start: 2019-10-16 | End: 2019-10-17 | Stop reason: HOSPADM

## 2019-10-16 RX ORDER — ESCITALOPRAM OXALATE 10 MG/1
10 TABLET ORAL DAILY
Status: DISCONTINUED | OUTPATIENT
Start: 2019-10-17 | End: 2019-10-17 | Stop reason: HOSPADM

## 2019-10-16 RX ORDER — LORAZEPAM 1 MG/1
1 TABLET ORAL ONCE
Status: COMPLETED | OUTPATIENT
Start: 2019-10-16 | End: 2019-10-16

## 2019-10-16 RX ORDER — ATORVASTATIN CALCIUM 20 MG/1
20 TABLET, FILM COATED ORAL DAILY
Status: DISCONTINUED | OUTPATIENT
Start: 2019-10-17 | End: 2019-10-17 | Stop reason: HOSPADM

## 2019-10-16 RX ORDER — CLONAZEPAM 1 MG/1
1 TABLET ORAL NIGHTLY PRN
Status: DISCONTINUED | OUTPATIENT
Start: 2019-10-16 | End: 2019-10-17 | Stop reason: HOSPADM

## 2019-10-16 RX ADMIN — LORAZEPAM 1 MG: 1 TABLET ORAL at 15:30

## 2019-10-16 RX ADMIN — CLONAZEPAM 1 MG: 1 TABLET ORAL at 20:40

## 2019-10-16 NOTE — ED PROVIDER NOTES
EMERGENCY DEPARTMENT ENCOUNTER    Room Number:  26/26  Date seen:  10/16/2019  Time seen: 1:19 PM  PCP: Luis Armando Bolanos MD  Historian: Pt and wife      HPI:  Chief Complaint: SI  A complete HPI/ROS/PMH/PSH/SH/FH are unobtainable due to: none  Context: Ochoa Mojica is a 54 y.o. male who presents to the ED c/o SI. The pt states he has recently been dealing with multiple medical problems including a recent retinal detachment with failed surgery. He states he recently went back to work but has continued to have worsening anxiety and depression. He was seen here yesterday and was discharged home. He denied having SI then but is now having dark thoughts but no clear plan. He doesn't have weapons at home. Denies Hx of self harm. He denies illicit drug use. Denies A/V hallucinations.     PAST MEDICAL HISTORY  Active Ambulatory Problems     Diagnosis Date Noted   • Septic bursitis of elbow 01/12/2017     Resolved Ambulatory Problems     Diagnosis Date Noted   • No Resolved Ambulatory Problems     Past Medical History:   Diagnosis Date   • Bursitis    • Hyperlipidemia    • Hypertension    • Strep throat 11/2016         PAST SURGICAL HISTORY  Past Surgical History:   Procedure Laterality Date   • BACK SURGERY  2014    herniated disc    • BACK SURGERY      6/1999 5/2012   • INCISION AND DRAINAGE ARM Right 1/12/2017    Procedure: INCISION / DEBRIDEMENT BONE ELBOW;  Surgeon: Jovany Cavazos MD;  Location: [facility];  Service:    • RETINAL DETACHMENT SURGERY      9/2019   • TONSILLECTOMY      CHILDHOOD         FAMILY HISTORY  History reviewed. No pertinent family history.      SOCIAL HISTORY  Social History     Socioeconomic History   • Marital status:      Spouse name: Not on file   • Number of children: Not on file   • Years of education: Not on file   • Highest education level: Not on file   Occupational History   • Occupation:    Tobacco Use   • Smoking status: Never Smoker   • Smokeless tobacco:  Never Used   Substance and Sexual Activity   • Alcohol use: Yes     Comment: RARELY   • Drug use: No   • Sexual activity: Defer         ALLERGIES  Patient has no known allergies.        REVIEW OF SYSTEMS  Review of Systems   Constitutional: Negative for activity change, appetite change and fever.   HENT: Negative for congestion and sore throat.    Eyes: Negative.    Respiratory: Negative for cough and shortness of breath.    Cardiovascular: Negative for chest pain and leg swelling.   Gastrointestinal: Negative for abdominal pain, diarrhea and vomiting.   Endocrine: Negative.    Genitourinary: Negative for decreased urine volume and dysuria.   Musculoskeletal: Negative for neck pain.   Skin: Negative for rash and wound.   Allergic/Immunologic: Negative.    Neurological: Negative for weakness, numbness and headaches.   Hematological: Negative.    Psychiatric/Behavioral: Positive for suicidal ideas.   All other systems reviewed and are negative.       All systems reviewed and negative except for those discussed in HPI.       PHYSICAL EXAM  ED Triage Vitals   Temp Heart Rate Resp BP SpO2   10/16/19 1301 10/16/19 1301 10/16/19 1301 10/16/19 1314 10/16/19 1301   97.6 °F (36.4 °C) 101 16 126/82 97 %      Temp src Heart Rate Source Patient Position BP Location FiO2 (%)   10/16/19 1301 -- 10/16/19 1314 10/16/19 1314 --   Tympanic  Lying Right arm          GENERAL: not distressed  HENT: nares patent  EYES: no scleral icterus  CV: regular rhythm, regular rate, no murmur  RESPIRATORY: normal effort, CTAB  ABDOMEN: soft, nontender   MUSCULOSKELETAL: no deformity  NEURO: alert, moves all extremities, follows commands  SKIN: warm, dry  PSYCH: Anxious affect, linear thought    Vital signs and nursing notes reviewed.      LAB RESULTS  Recent Results (from the past 24 hour(s))   Ethanol    Collection Time: 10/16/19  1:23 PM   Result Value Ref Range    Ethanol <10 0 - 10 mg/dL    Ethanol % <0.010 %   Urine Drug Screen - Urine, Clean  Catch    Collection Time: 10/16/19  1:29 PM   Result Value Ref Range    Amphet/Methamphet, Screen Negative Negative    Barbiturates Screen, Urine Negative Negative    Benzodiazepine Screen, Urine Negative Negative    Cocaine Screen, Urine Negative Negative    Opiate Screen Negative Negative    THC, Screen, Urine Negative Negative    Methadone Screen, Urine Negative Negative    Oxycodone Screen, Urine Negative Negative       Ordered the above labs and reviewed the results.        RADIOLOGY  No Radiology Exams Resulted Within Past 24 Hours    I ordered the above noted radiological studies. Reviewed by me and discussed with radiologist.  See dictation for official radiology interpretation.        PROCEDURES  Procedures      MEDICATIONS GIVEN IN ER  Medications   LORazepam (ATIVAN) tablet 1 mg (1 mg Oral Given 10/16/19 1530)         MEDICATIONS GIVEN IN ER    Medications   LORazepam (ATIVAN) tablet 1 mg (1 mg Oral Given 10/16/19 1530)         PROGRESS, DATA ANALYSIS, CONSULTS, AND MEDICAL DECISION MAKING    All labs have been independently reviewed by me.  All radiology studies have been reviewed by me and discussed with radiologist dictating the report.   EKG's independently viewed and interpreted by me.  Discussion below represents my analysis of pertinent findings related to patient's condition, differential diagnosis, treatment plan and final disposition.      ED Course as of Oct 16 1637   Wed Oct 16, 2019   1336 I placed a consult for psychiatry.  [TD]      ED Course User Index  [TD] Anderson Gillespie II, MD     Alcohol is negative.      1625 Per [facility] nurse, they spoke with Dr. Scruggs who agrees to admit the pt to behavioral health. Pt understands and agrees with the plan. Will order Ativan for anxiousness.       AS OF 4:37 PM VITALS:    BP - 137/83  HR - 78  TEMP - 97.6 °F (36.4 °C) (Tympanic)  02 SATS - 97%        DIAGNOSIS  Final diagnoses:   Anxiety   Depression, unspecified depression type          DISPOSITION  ADMISSION    Discussed treatment plan and reason for admission with pt/family and admitting physician.  Pt/family voiced understanding of the plan for admission for further testing/treatment as needed.         --  Documentation assistance provided by sarah Maria for Dr. Anderson Gillespie MD.  Information recorded by the scribe was done at my direction and has been verified and validated by me.             Henrique Maria  10/16/19 1637       Henrique Maria  10/16/19 1638       Anderson Gillespie II, MD  10/16/19 1926

## 2019-10-16 NOTE — CONSULTS
"[facility] evaluated pt. Pt's wife in room with pt's permission. Pt is very anxious. Pt had surgery for a detached retina in July but it failed and he had another surgery. Pt states he has a gas bubble in the eye for at least a month to keep pressure on the eye to heal the retina. Pt has some obsessive guilt about being responsible for the first failed surgery after he feels he carried something he shouldn't have.  Pt is catastrophizing and is hopeless at times about whether his eye will ever heal.  Pt has to sleep with his head elevated and he has to sleep on his left side. This has caused some issues with his back. Pt has had very poor sleep for over a month.     Pt was in ED last night and was evaluated by [facility]. Pt denied any SI last night but comes in today admitting some \"dark thoughts\" of harming self though he has no plan or intent.Pt states he gets \"discouraged and want to end it.\" Pt doesn't trust he can keep self safe and states he sometimes thinks his wife would be better off financially if he were dead. Pt was given a 5 day prescription for a nightime dose of Klonopine  While in ED last night. Pt states he had a good night sleep last night but woke up more anxious. Pt has been increasingly anxious all day. Pt rates his anxiety and depressed mood as a \"7 or 8.\" Pt denies any alcohol, tobacco or drug use. Pt fearful he will lose his job as an .     Pt was started on Lexapro two days ago. Access consulted Dr Scruggs who agreed to admit pt to [facility]. Pt is very anxious but agreeable to plan. Pt's wife agrees with plan as does ED dr. Pt will have a sitter due to being in a medical bed. This made pt anxious. ED dr will give pt some Ativan to calm the anxiety.  "

## 2019-10-16 NOTE — ED NOTES
"Pt states \"I was seen here last night for anxiety but my depression has escalated and now I'm afraid I'm going to hurt myself.\" Pt confirms SI, denies HI.     Brianne Hernandez, RN  10/16/19 5621    "

## 2019-10-16 NOTE — NURSING NOTE
Patient arrived to unit at 1826. Patient explained unit routine/policies. Vitals and weight obtained. Skin assessment completes. No issues noted. Nightshift RN to [facility] admission database. Patient indifferent during interaction.

## 2019-10-16 NOTE — ED NOTES
PT cannot have nitrous oxide due to gas bubble in right eye.  Had recent surgery for a detached retina.     Alejandro Lee  10/16/19 1315       Alejandro Lee  10/16/19 4657

## 2019-10-17 ENCOUNTER — APPOINTMENT (OUTPATIENT)
Dept: PSYCHIATRY | Facility: HOSPITAL | Age: 54
End: 2019-10-17

## 2019-10-17 VITALS
DIASTOLIC BLOOD PRESSURE: 72 MMHG | TEMPERATURE: 97.2 F | HEART RATE: 101 BPM | BODY MASS INDEX: 27.38 KG/M2 | WEIGHT: 206.57 LBS | HEIGHT: 73 IN | OXYGEN SATURATION: 96 % | RESPIRATION RATE: 16 BRPM | SYSTOLIC BLOOD PRESSURE: 113 MMHG

## 2019-10-17 LAB — HBA1C MFR BLD: 6.43 % (ref 4.8–5.6)

## 2019-10-17 RX ORDER — HYDROXYZINE PAMOATE 25 MG/1
50 CAPSULE ORAL 4 TIMES DAILY PRN
Status: DISCONTINUED | OUTPATIENT
Start: 2019-10-17 | End: 2019-10-17 | Stop reason: HOSPADM

## 2019-10-17 RX ORDER — ESCITALOPRAM OXALATE 10 MG/1
10 TABLET ORAL DAILY
COMMUNITY

## 2019-10-17 RX ORDER — LISINOPRIL 20 MG/1
20 TABLET ORAL DAILY
COMMUNITY

## 2019-10-17 RX ORDER — HYDROXYZINE PAMOATE 50 MG/1
50 CAPSULE ORAL 4 TIMES DAILY PRN
Qty: 75 CAPSULE | Refills: 1 | Status: SHIPPED | OUTPATIENT
Start: 2019-10-17 | End: 2019-10-25 | Stop reason: HOSPADM

## 2019-10-17 RX ORDER — TRAZODONE HYDROCHLORIDE 50 MG/1
50 TABLET ORAL NIGHTLY PRN
Qty: 30 TABLET | Refills: 1 | Status: SHIPPED | OUTPATIENT
Start: 2019-10-17

## 2019-10-17 RX ORDER — TRAZODONE HYDROCHLORIDE 50 MG/1
50 TABLET ORAL NIGHTLY PRN
Status: DISCONTINUED | OUTPATIENT
Start: 2019-10-17 | End: 2019-10-17 | Stop reason: HOSPADM

## 2019-10-17 RX ORDER — MONTELUKAST SODIUM 10 MG/1
10 TABLET ORAL NIGHTLY
Status: ON HOLD | COMMUNITY
End: 2019-10-17

## 2019-10-17 RX ADMIN — ESCITALOPRAM 10 MG: 10 TABLET, FILM COATED ORAL at 08:52

## 2019-10-17 RX ADMIN — ATORVASTATIN CALCIUM 20 MG: 20 TABLET, FILM COATED ORAL at 08:52

## 2019-10-17 RX ADMIN — LISINOPRIL 20 MG: 20 TABLET ORAL at 08:52

## 2019-10-17 NOTE — DISCHARGE SUMMARY
DATES OF ADMISSION: 10/16/2019-10/17/2019    REASON FOR ADMISSION: The patient is a 54-year-old white male admitted with increasing anxiety and vague thoughts of suicide    LABS: See chart    HOSPITAL COURSE: Patient was admitted to the crisis management unit and placed on hold to programming secondary to his need for a hospital bed given his recent issues of retinal detachment and surgical repair.  Patient was continued on previous prescribed Klonopin for sleep and Lexapro.  Other medications were also continued.  The patient reported to this physician on 10/17/2019 that the therapeutic milieu was more stress inducing for him.  He had had an episode where in a demented patient had entered his room in a state of undress.  The patient requested discharge and was agreeable to plan follow-up in the intensive outpatient program for continuation of Lexapro.  Additionally, this physician added PRN trazodone and Vistaril to address his symptoms of insomnia and anxiety respectively.  Discharge was ordered as per patient request on 10/17/2019.    FINAL DIAGNOSIS: Major depressive disorder single episode mild, anxiety disorder unspecified, history of retinal detachment with surgical repair x2, dyslipidemia, hypertension    DISPOSITION ON DISCHARGE: A full listing the patient's medications is provided below.  Follow-up will take place in the intensive outpatient program provided by this facility    PROGNOSIS: Good    .     Discharge Medications      New Medications      Instructions Start Date   hydrOXYzine pamoate 50 MG capsule  Commonly known as:  VISTARIL   50 mg, Oral, 4 Times Daily PRN      traZODone 50 MG tablet  Commonly known as:  DESYREL   50 mg, Oral, Nightly PRN         Continue These Medications      Instructions Start Date   escitalopram 10 MG tablet  Commonly known as:  LEXAPRO  Notes to patient:  10/18 9:00 a.m.   10 mg, Oral, Daily      lisinopril 20 MG tablet  Commonly known as:  PRINIVIL,ZESTRIL  Notes to  patient:  10/18 9:00 a.m.   20 mg, Oral, Daily      verapamil  MG CR tablet  Commonly known as:  CALAN-SR  Notes to patient:  10/17    180 mg, Oral, Nightly         Stop These Medications    clonazePAM 1 MG tablet  Commonly known as:  KlonoPIN

## 2019-10-17 NOTE — PLAN OF CARE
"Problem: Patient Care Overview  Goal: Plan of Care Review  Outcome: Ongoing (interventions implemented as appropriate)   10/17/19 0200 10/17/19 0411   Plan of Care Review   Progress --  improving   OTHER   Outcome Summary --  Pt is currently denying SI/HI/AVH. Pt reports anxiety as low and depression is present because he is here. Pt reports that \"this may not be the right place for me\". Pt reports that he was frustrated over his recent health issues and reported that he \"would rather be dead than be blind\". Pt was cooperative with care and compliant with meds. Pt was withdrawn to his room stating that \"this place freaks me out\". Pt slept all night following prn Klonopin. Sitter remains for safety. 10/17/19 0415     Coping/Psychosocial   Plan of Care Reviewed With patient --    Coping/Psychosocial   Patient Agreement with Plan of Care agrees --        Problem: Overarching Goals (Adult)  Goal: Adheres to Safety Considerations for Self and Others  Outcome: Ongoing (interventions implemented as appropriate)   10/17/19 0411   Overarching Goals (Adult)   Adheres to Safety Considerations for Self and Others making progress toward outcome     Goal: Optimized Coping Skills in Response to Life Stressors  Outcome: Ongoing (interventions implemented as appropriate)   10/17/19 0411   Overarching Goals (Adult)   Optimized Coping Skills in Response to Life Stressors making progress toward outcome     Goal: Develops/Participates in Therapeutic Penrose to Support Successful Transition  Outcome: Ongoing (interventions implemented as appropriate)   10/17/19 0411   Overarching Goals (Adult)   Develops/Participates in Therapeutic Penrose to Support Successful Transition making progress toward outcome       Problem: Suicidal Behavior (Adult)  Goal: Suicidal Behavior is Absent/Minimized/Managed  Outcome: Ongoing (interventions implemented as appropriate)   10/17/19 0411   Suicidal Behavior is Absent/Minimized/Managed   Suicidal " Behavior Managed/Minimized Action Step (STG) Outcome making progress toward outcome

## 2019-10-17 NOTE — PROGRESS NOTES
Continued Stay Note  [facility]     Patient Name: Ohcoa Mojica  MRN: [id number]  Today's Date: 10/17/2019    Admit Date: 10/16/2019    Discharge Plan     Row Name 10/17/19 1015       Plan    Plan  Pt will return home.  Pt will participate in therapeutic groups/activities on the unit. Pt may d/c to [facility] IOP.  SW will explore outpt providers.    Plan Comments  SW met w/pt to discuss tx & d/c planning. Pt was able to verify demographic info as well as PCP, Dr. Luis Armando Bolanos.  SW inquired about outpt mental health providers; pt stated that he did not have any at this time.  SW also discussed meds to beds program w/pt' pt stated that he has all his medication at home.        Discharge Codes    No documentation.             HEMAL Pereira

## 2019-10-17 NOTE — PLAN OF CARE
Problem: Patient Care Overview  Goal: Discharge Needs Assessment  Outcome: Ongoing (interventions implemented as appropriate)   10/17/19 1015 10/17/19 1018   Discharge Needs Assessment   Concerns to be Addressed coping/stress;decision making;adjustment to diagnosis/illness;suicidal;mental health --    Patient/Family Anticipates Transition to home with family --    Patient/Family Anticipated Services at Transition mental health services --    Discharge Coordination/Progress --  Pt will return home. Pt will participate in therapeutic groups/activities on the unit. Pt may d/c to [facility] IOP. SW will explore outpt providers.

## 2019-10-17 NOTE — PROGRESS NOTES
Clinical Pharmacy Services: Medication History    Ochoa Mojica is a 54 y.o. male presenting to [facility] for Major depressive disorder, recurrent episode, moderate (CMS/HCC) [F33.1]    He  has a past medical history of Bursitis, Hyperlipidemia, Hypertension, and Strep throat (11/2016).    Allergies as of 10/16/2019   • (No Known Allergies)       Medication information was obtained from: pt  Pharmacy and Phone Number: [address] at [facility]     Prior to Admission Medications       Prescriptions Last Dose Informant Patient Reported? Taking?    clonazePAM (KlonoPIN) 1 MG tablet  Pharmacy No Yes    Take 1 tablet by mouth At Night As Needed for Anxiety (trouble sleeping).    escitalopram (LEXAPRO) 10 MG tablet  Pharmacy Yes Yes    Take 10 mg by mouth Daily.    lisinopril (PRINIVIL,ZESTRIL) 20 MG tablet  Self Yes Yes    Take 20 mg by mouth Daily.    verapamil SR (CALAN-SR) 180 MG CR tablet  Self Yes Yes    Take 180 mg by mouth Every Night.                Medication notes: started lexapro 10/7    This medication list is complete to the best of my knowledge as of 10/17/2019    Please call if questions.    Herlinda John, PharmD, BCPS  10/17/2019 9:43 AM

## 2019-10-17 NOTE — PLAN OF CARE
Problem: Patient Care Overview  Goal: Individualization and Mutuality  Outcome: Ongoing (interventions implemented as appropriate)    Goal: Interprofessional Rounds/Family Conf  Outcome: Ongoing (interventions implemented as appropriate)   10/17/19 1004   Interdisciplinary Rounds/Family Conf   Summary Treatment team met to discuss pt's plan of care. Pt will participate in therapeutic groups/activities as appropriate. Pt may attend  Psych [facility] upon d/c. SW will explore outpt providers. Progress & svcs will be assessed ongoing.   Interdisciplinary Rounds/Family Conf   Participants art therapy;;nursing;psychiatrist;pharmacy;social work     Patient/Guardian Signature: __________________________________            Psychiatrist Signature: ______________________________________             Therapist Signature: ________________________________________         Nurse Signature: ___________________________________________          Staff Signature: ____________________________________________            Staff Signature: ____________________________________________          Staff Signature: ____________________________________________          Staff Signature:                                                                                                      Problem: Mood Impairment (Anxiety Signs/Symptoms) (Pediatric)  Goal: Improved Mood Symptoms (Anxiety Signs/Symptoms)  Outcome: Ongoing (interventions implemented as appropriate)      Problem: Activity/Energy/Vigor Impairment (Anxiety Signs/Symptoms) (Pediatric)  Goal: Improved Energy/Vigor (Anxiety Signs/Symptoms)  Outcome: Ongoing (interventions implemented as appropriate)      Problem: Sleep Impairment (Anxiety Signs/Symptoms) (Pediatric)  Goal: Improved Sleep Hygiene (Anxiety Signs/Symptoms)  Outcome: Ongoing (interventions implemented as appropriate)

## 2019-10-17 NOTE — PROGRESS NOTES
Continued Stay Note  Psychiatric     Patient Name: Ochoa Mojica  MRN: [id number]  Today's Date: 10/17/2019    Admit Date: 10/16/2019    Discharge Plan     Row Name 10/17/19 1348       Plan    Final Discharge Disposition Code  01 - home or self-care    Final Note  Pt was discharged per Dr. Scruggs's orders.  SW met w/pt to discuss follow-up care.  Pt agreed to attend [facility] Psych IOP beginning 10/18/19.  [facility] also scheduled an appt w/pt's PCP, Dr. Luis Armando Bolanos to check A1C.  Pt transported home by a family member.        Discharge Codes    No documentation.       Expected Discharge Date and Time     Expected Discharge Date Expected Discharge Time    Oct 17, 2019             HEMAL Pereira

## 2019-10-17 NOTE — H&P
"IDENTIFYING INFORMATION: The patient is a 54-year-old white male whose had several recent health stressors admitted with increasing depression and anxiety and vague suicidal ideation.    CHIEF COMPLAINT: None given    INFORMANT: Patient and chart    RELIABILITY: Good    HISTORY OF PRESENT ILLNESS: The patient is a 54-year-old white male admitted to the crisis management unit after he had presented to the emergency room yesterday complaining of overwhelming anxiety.  The patient has had increasing hopelessness and has been engaging in \"catastrophic thinking\" related to multiple health issues he is recently experienced.  The patient has had a detached retina which is required repair on 2 recent occasions.  He continues to complain of poor vision out of his right eye related thereto.  Patient reports that he feels bored during the day and is also complaining of increasing anxiety.  He also reports poor sleep.  2 days prior to admission to the hospital the patient been started on Lexapro by his primary care physician Dr. Bolanos.  He was also given a prescription for nighttime Klonopin.  The patient is employed as an  for [email or web address] and [employer].  He has expressed concern that he may lose his job secondary to his health woes.  She currently denies suicidal ideation.  He is requesting discharge from the hospital stating that the atmosphere on the [facility] has been more stress inducing for him.  He is expressing interest in participation in the intensive outpatient program.  He vehemently denies current suicidal or homicidal ideation    PAST PSYCHIATRIC HISTORY: None prior to initiation of Lexapro 2 days prior to admission    PAST MEDICAL HISTORY: Significant for the aforementioned ophthalmologic issues.  Patient is also complaining of some recent back pain related to the postures he has had to assume postoperatively after his retina surgery.  Patient also suffers from hypertension and dyslipidemia    MEDICATIONS: "   Current Facility-Administered Medications   Medication Dose Route Frequency Provider Last Rate Last Dose   • acetaminophen (TYLENOL) tablet 650 mg  650 mg Oral Q4H PRN Antonio Scruggs III, MD       • aluminum-magnesium hydroxide-simethicone (MAALOX MAX) 400-400-40 MG/5ML suspension 15 mL  15 mL Oral Q6H PRN Antonio Scruggs III, MD       • atorvastatin (LIPITOR) tablet 20 mg  20 mg Oral Daily Antonio Scruggs III, MD   20 mg at 10/17/19 0852   • clonazePAM (KlonoPIN) tablet 1 mg  1 mg Oral Nightly PRN Antonio Scruggs III, MD   1 mg at 10/16/19 2040   • escitalopram (LEXAPRO) tablet 10 mg  10 mg Oral Daily Antonio Scruggs III, MD   10 mg at 10/17/19 0852   • lisinopril (PRINIVIL,ZESTRIL) tablet 20 mg  20 mg Oral Q24H Jeanne Pedersen APRN   20 mg at 10/17/19 0852   • magnesium hydroxide (MILK OF MAGNESIA) suspension 2400 mg/10mL 10 mL  10 mL Oral Daily PRN Antonio Scruggs III, MD       • verapamil SR (CALAN-SR) CR tablet 180 mg  180 mg Oral Q24H Jeanne Pedersen APRN         Field    ALLERGIES: None    FAMILY HISTORY: Noncontributory    SOCIAL HISTORY: The patient has an associates degree in accounting and is an  for [employer].  He denies use of alcohol back or street drugs.  He lives with his wife.    MENTAL STATUS EXAM: The patient is well-developed large white male appearing stated age.  He is no apparent physical distress at the time the examination.  He is awake alert and oriented ulcers.  His mood is mildly dysphoric his affect congruent.  Speech is well coherent.  There are no deficits memory cognition noted.  Intelligence is judged in the average range based on fund of knowledge, the patient is cooperative throughout the interview.  He denies suicidal homicidal ideation or psychotic features.  His judgment insight appear to be intact.    ASSETS/LIABILITIES: Motivation for change/health issues    DIAGNOSTIC IMPRESSION:  "Major depressive disorder single episode mild, anxiety disorder unspecified, history of retinal detachment x2, hypertension, dyslipidemia    PLAN: The patient is denying suicidal ideation today and is requesting discharge.  He reports that the stressful milieu of the [facility] is \"making things worse\".  As he is able to promise safety outside the hospital, I will go ahead and discharge the patient with a plan for follow-up in the intensive outpatient program and continuation of Lexapro.  I will also add PRN trazodone and Vistaril.  "

## 2019-10-17 NOTE — PLAN OF CARE
Problem: Mood Impairment (Anxiety Signs/Symptoms) (Pediatric)  Goal: Improved Mood Symptoms (Anxiety Signs/Symptoms)  Outcome: Ongoing (interventions implemented as appropriate)   10/17/19 0612   Improved Mood Symptoms (Anxiety Signs/Symptoms)   Improved Mood Symptoms Action Step/Short Term Goal (STG) Established 10/17/19   Improved Mood Symptoms Time Frame for Action Step (STG) 4 days   Improved Mood Symptoms Action Step (STG) Outcome making progress toward outcome       Problem: Activity/Energy/Vigor Impairment (Anxiety Signs/Symptoms) (Pediatric)  Goal: Improved Energy/Vigor (Anxiety Signs/Symptoms)  Outcome: Ongoing (interventions implemented as appropriate)   10/17/19 0612   Improved Energy/Vigor (Anxiety Signs/Symptoms)   Improved Energy/Vigor Action Step/Short Term Goal (STG) Established 10/17/19   Improved Energy/Vigor Time Frame for Action Step (STG) 4 days   Improved Activity/Energy/Vigor Action Step (STG) Outcome making progress toward outcome       Problem: Sleep Impairment (Anxiety Signs/Symptoms) (Pediatric)  Goal: Improved Sleep Hygiene (Anxiety Signs/Symptoms)  Outcome: Ongoing (interventions implemented as appropriate)   10/17/19 0612   Improved Sleep Hygiene (Anxiety Signs/Symptoms)   Improved Sleep Hygiene Action Step/Short Term Goal (STG) Established 10/17/19   Improved Sleep Hygiene Time Frame for Action Step (STG) 4 days   Improved Sleep Hygiene Action Step (STG) Outcome making progress toward outcome

## 2019-10-18 ENCOUNTER — HOSPITAL ENCOUNTER (INPATIENT)
Facility: HOSPITAL | Age: 54
LOS: 7 days | Discharge: HOME | End: 2019-10-25
Attending: SPECIALIST | Admitting: SPECIALIST

## 2019-10-18 ENCOUNTER — APPOINTMENT (OUTPATIENT)
Dept: PSYCHIATRY | Facility: HOSPITAL | Age: 54
End: 2019-10-18

## 2019-10-18 ENCOUNTER — HOSPITAL ENCOUNTER (EMERGENCY)
Facility: HOSPITAL | Age: 54
Discharge: PSYCHIATRIC OR SUBSTANCE USE FACILITY | End: 2019-10-18
Attending: EMERGENCY MEDICINE | Admitting: EMERGENCY MEDICINE

## 2019-10-18 VITALS
RESPIRATION RATE: 18 BRPM | TEMPERATURE: 98.5 F | SYSTOLIC BLOOD PRESSURE: 131 MMHG | HEART RATE: 110 BPM | OXYGEN SATURATION: 96 % | WEIGHT: 200 LBS | DIASTOLIC BLOOD PRESSURE: 85 MMHG | BODY MASS INDEX: 26.51 KG/M2 | HEIGHT: 73 IN

## 2019-10-18 DIAGNOSIS — S61.512A LACERATION OF LEFT WRIST, INITIAL ENCOUNTER: ICD-10-CM

## 2019-10-18 DIAGNOSIS — R45.851 SUICIDAL IDEATION: Primary | ICD-10-CM

## 2019-10-18 PROBLEM — F33.9 MAJOR DEPRESSIVE DISORDER, RECURRENT (HCC): Status: ACTIVE | Noted: 2019-10-18

## 2019-10-18 LAB
AMPHET+METHAMPHET UR QL: NEGATIVE
BARBITURATES UR QL SCN: NEGATIVE
BENZODIAZ UR QL SCN: POSITIVE
CANNABINOIDS SERPL QL: NEGATIVE
COCAINE UR QL: NEGATIVE
ETHANOL BLD-MCNC: <10 MG/DL (ref 0–10)
ETHANOL UR QL: <0.01 %
METHADONE UR QL SCN: NEGATIVE
OPIATES UR QL: NEGATIVE
OXYCODONE UR QL SCN: NEGATIVE

## 2019-10-18 PROCEDURE — 80307 DRUG TEST PRSMV CHEM ANLYZR: CPT | Performed by: NURSE PRACTITIONER

## 2019-10-18 PROCEDURE — 99284 EMERGENCY DEPT VISIT MOD MDM: CPT

## 2019-10-18 PROCEDURE — 0HQEXZZ REPAIR LEFT LOWER ARM SKIN, EXTERNAL APPROACH: ICD-10-PCS | Performed by: EMERGENCY MEDICINE

## 2019-10-18 PROCEDURE — 90715 TDAP VACCINE 7 YRS/> IM: CPT | Performed by: NURSE PRACTITIONER

## 2019-10-18 PROCEDURE — 90791 PSYCH DIAGNOSTIC EVALUATION: CPT

## 2019-10-18 PROCEDURE — 90471 IMMUNIZATION ADMIN: CPT | Performed by: NURSE PRACTITIONER

## 2019-10-18 PROCEDURE — 96372 THER/PROPH/DIAG INJ SC/IM: CPT

## 2019-10-18 PROCEDURE — 25010000002 TDAP 5-2.5-18.5 LF-MCG/0.5 SUSPENSION: Performed by: NURSE PRACTITIONER

## 2019-10-18 RX ORDER — HYDROXYZINE PAMOATE 25 MG/1
50 CAPSULE ORAL 4 TIMES DAILY PRN
Status: DISCONTINUED | OUTPATIENT
Start: 2019-10-18 | End: 2019-10-19

## 2019-10-18 RX ORDER — ALUMINA, MAGNESIA, AND SIMETHICONE 2400; 2400; 240 MG/30ML; MG/30ML; MG/30ML
15 SUSPENSION ORAL EVERY 6 HOURS PRN
Status: DISCONTINUED | OUTPATIENT
Start: 2019-10-18 | End: 2019-10-25 | Stop reason: HOSPADM

## 2019-10-18 RX ORDER — LIDOCAINE HYDROCHLORIDE AND EPINEPHRINE 10; 10 MG/ML; UG/ML
10 INJECTION, SOLUTION INFILTRATION; PERINEURAL ONCE
Status: COMPLETED | OUTPATIENT
Start: 2019-10-18 | End: 2019-10-18

## 2019-10-18 RX ORDER — TRAZODONE HYDROCHLORIDE 50 MG/1
50 TABLET ORAL NIGHTLY PRN
Status: DISCONTINUED | OUTPATIENT
Start: 2019-10-18 | End: 2019-10-25 | Stop reason: HOSPADM

## 2019-10-18 RX ORDER — ESCITALOPRAM OXALATE 10 MG/1
10 TABLET ORAL DAILY
Status: DISCONTINUED | OUTPATIENT
Start: 2019-10-19 | End: 2019-10-25 | Stop reason: HOSPADM

## 2019-10-18 RX ORDER — ACETAMINOPHEN 325 MG/1
650 TABLET ORAL EVERY 4 HOURS PRN
Status: DISCONTINUED | OUTPATIENT
Start: 2019-10-18 | End: 2019-10-25 | Stop reason: HOSPADM

## 2019-10-18 RX ORDER — LORAZEPAM 1 MG/1
1 TABLET ORAL ONCE
Status: COMPLETED | OUTPATIENT
Start: 2019-10-18 | End: 2019-10-18

## 2019-10-18 RX ORDER — LISINOPRIL 20 MG/1
20 TABLET ORAL
Status: DISCONTINUED | OUTPATIENT
Start: 2019-10-19 | End: 2019-10-25 | Stop reason: HOSPADM

## 2019-10-18 RX ADMIN — LORAZEPAM 1 MG: 1 TABLET ORAL at 17:31

## 2019-10-18 RX ADMIN — LIDOCAINE HYDROCHLORIDE AND EPINEPHRINE 10 ML: 10; 10 INJECTION, SOLUTION INFILTRATION; PERINEURAL at 17:31

## 2019-10-18 RX ADMIN — TRAZODONE HYDROCHLORIDE 50 MG: 50 TABLET ORAL at 21:13

## 2019-10-18 RX ADMIN — VERAPAMIL HYDROCHLORIDE 180 MG: 180 TABLET, FILM COATED, EXTENDED RELEASE ORAL at 21:08

## 2019-10-18 RX ADMIN — TETANUS TOXOID, REDUCED DIPHTHERIA TOXOID AND ACELLULAR PERTUSSIS VACCINE, ADSORBED 0.5 ML: 5; 2.5; 8; 8; 2.5 SUSPENSION INTRAMUSCULAR at 14:08

## 2019-10-19 RX ORDER — HYDROXYZINE PAMOATE 25 MG/1
50 CAPSULE ORAL EVERY 4 HOURS PRN
Status: DISCONTINUED | OUTPATIENT
Start: 2019-10-19 | End: 2019-10-25 | Stop reason: HOSPADM

## 2019-10-19 RX ADMIN — ESCITALOPRAM 10 MG: 10 TABLET, FILM COATED ORAL at 08:37

## 2019-10-19 RX ADMIN — HYDROXYZINE PAMOATE 50 MG: 25 CAPSULE ORAL at 14:41

## 2019-10-19 RX ADMIN — TRAZODONE HYDROCHLORIDE 50 MG: 50 TABLET ORAL at 20:19

## 2019-10-19 RX ADMIN — VERAPAMIL HYDROCHLORIDE 180 MG: 180 TABLET, FILM COATED, EXTENDED RELEASE ORAL at 20:11

## 2019-10-19 RX ADMIN — LISINOPRIL 20 MG: 20 TABLET ORAL at 08:37

## 2019-10-20 RX ADMIN — VERAPAMIL HYDROCHLORIDE 180 MG: 180 TABLET, FILM COATED, EXTENDED RELEASE ORAL at 20:08

## 2019-10-20 RX ADMIN — LISINOPRIL 20 MG: 20 TABLET ORAL at 09:07

## 2019-10-20 RX ADMIN — TRAZODONE HYDROCHLORIDE 50 MG: 50 TABLET ORAL at 20:09

## 2019-10-20 RX ADMIN — HYDROXYZINE PAMOATE 50 MG: 25 CAPSULE ORAL at 20:09

## 2019-10-20 RX ADMIN — HYDROXYZINE PAMOATE 50 MG: 25 CAPSULE ORAL at 05:52

## 2019-10-20 RX ADMIN — ESCITALOPRAM 10 MG: 10 TABLET, FILM COATED ORAL at 09:07

## 2019-10-21 ENCOUNTER — APPOINTMENT (OUTPATIENT)
Dept: PSYCHIATRY | Facility: HOSPITAL | Age: 54
End: 2019-10-21

## 2019-10-21 RX ORDER — GABAPENTIN 100 MG/1
100 CAPSULE ORAL 3 TIMES DAILY
Status: DISCONTINUED | OUTPATIENT
Start: 2019-10-21 | End: 2019-10-22

## 2019-10-21 RX ADMIN — HYDROXYZINE PAMOATE 50 MG: 25 CAPSULE ORAL at 08:34

## 2019-10-21 RX ADMIN — GABAPENTIN 100 MG: 100 CAPSULE ORAL at 12:20

## 2019-10-21 RX ADMIN — LISINOPRIL 20 MG: 20 TABLET ORAL at 08:26

## 2019-10-21 RX ADMIN — ESCITALOPRAM 10 MG: 10 TABLET, FILM COATED ORAL at 08:26

## 2019-10-21 RX ADMIN — VERAPAMIL HYDROCHLORIDE 180 MG: 180 TABLET, FILM COATED, EXTENDED RELEASE ORAL at 21:46

## 2019-10-21 RX ADMIN — GABAPENTIN 100 MG: 100 CAPSULE ORAL at 16:55

## 2019-10-21 RX ADMIN — GABAPENTIN 100 MG: 100 CAPSULE ORAL at 21:46

## 2019-10-21 RX ADMIN — TRAZODONE HYDROCHLORIDE 50 MG: 50 TABLET ORAL at 21:46

## 2019-10-22 ENCOUNTER — APPOINTMENT (OUTPATIENT)
Dept: PSYCHIATRY | Facility: HOSPITAL | Age: 54
End: 2019-10-22

## 2019-10-22 RX ORDER — GABAPENTIN 100 MG/1
100 CAPSULE ORAL 3 TIMES DAILY
Qty: 90 CAPSULE | Refills: 0 | Status: SHIPPED | OUTPATIENT
Start: 2019-10-22

## 2019-10-22 RX ORDER — GABAPENTIN 300 MG/1
300 CAPSULE ORAL 3 TIMES DAILY
Status: DISCONTINUED | OUTPATIENT
Start: 2019-10-22 | End: 2019-10-25 | Stop reason: HOSPADM

## 2019-10-22 RX ORDER — HYDROXYZINE PAMOATE 50 MG/1
50 CAPSULE ORAL EVERY 4 HOURS PRN
Qty: 80 CAPSULE | Refills: 0 | Status: SHIPPED | OUTPATIENT
Start: 2019-10-22

## 2019-10-22 RX ADMIN — ESCITALOPRAM 10 MG: 10 TABLET, FILM COATED ORAL at 08:16

## 2019-10-22 RX ADMIN — GABAPENTIN 300 MG: 300 CAPSULE ORAL at 21:38

## 2019-10-22 RX ADMIN — GABAPENTIN 300 MG: 300 CAPSULE ORAL at 16:36

## 2019-10-22 RX ADMIN — HYDROXYZINE PAMOATE 50 MG: 25 CAPSULE ORAL at 16:36

## 2019-10-22 RX ADMIN — LISINOPRIL 20 MG: 20 TABLET ORAL at 08:16

## 2019-10-22 RX ADMIN — TRAZODONE HYDROCHLORIDE 50 MG: 50 TABLET ORAL at 21:38

## 2019-10-22 RX ADMIN — GABAPENTIN 100 MG: 100 CAPSULE ORAL at 08:16

## 2019-10-23 ENCOUNTER — APPOINTMENT (OUTPATIENT)
Dept: PSYCHIATRY | Facility: HOSPITAL | Age: 54
End: 2019-10-23

## 2019-10-23 RX ORDER — ARIPIPRAZOLE 2 MG/1
2 TABLET ORAL DAILY
Status: DISCONTINUED | OUTPATIENT
Start: 2019-10-23 | End: 2019-10-25 | Stop reason: HOSPADM

## 2019-10-23 RX ADMIN — HYDROXYZINE PAMOATE 50 MG: 25 CAPSULE ORAL at 12:27

## 2019-10-23 RX ADMIN — GABAPENTIN 300 MG: 300 CAPSULE ORAL at 21:04

## 2019-10-23 RX ADMIN — HYDROXYZINE PAMOATE 50 MG: 25 CAPSULE ORAL at 21:04

## 2019-10-23 RX ADMIN — TRAZODONE HYDROCHLORIDE 50 MG: 50 TABLET ORAL at 21:04

## 2019-10-23 RX ADMIN — GABAPENTIN 300 MG: 300 CAPSULE ORAL at 08:44

## 2019-10-23 RX ADMIN — LISINOPRIL 20 MG: 20 TABLET ORAL at 08:45

## 2019-10-23 RX ADMIN — GABAPENTIN 300 MG: 300 CAPSULE ORAL at 16:25

## 2019-10-23 RX ADMIN — ESCITALOPRAM 10 MG: 10 TABLET, FILM COATED ORAL at 08:45

## 2019-10-23 RX ADMIN — HYDROXYZINE PAMOATE 50 MG: 25 CAPSULE ORAL at 16:25

## 2019-10-23 RX ADMIN — VERAPAMIL HYDROCHLORIDE 180 MG: 180 TABLET, FILM COATED, EXTENDED RELEASE ORAL at 21:03

## 2019-10-23 RX ADMIN — ARIPIPRAZOLE 2 MG: 2 TABLET ORAL at 16:25

## 2019-10-24 ENCOUNTER — APPOINTMENT (OUTPATIENT)
Dept: PSYCHIATRY | Facility: HOSPITAL | Age: 54
End: 2019-10-24

## 2019-10-24 LAB
CHOLEST SERPL-MCNC: 133 MG/DL (ref 0–200)
HDLC SERPL-MCNC: 34 MG/DL (ref 40–60)
LDLC SERPL CALC-MCNC: 86 MG/DL (ref 0–100)
LDLC/HDLC SERPL: 2.52 {RATIO}
TRIGL SERPL-MCNC: 66 MG/DL (ref 0–150)
VLDLC SERPL-MCNC: 13.2 MG/DL (ref 5–40)

## 2019-10-24 PROCEDURE — 80061 LIPID PANEL: CPT | Performed by: SPECIALIST

## 2019-10-24 RX ADMIN — TRAZODONE HYDROCHLORIDE 50 MG: 50 TABLET ORAL at 20:44

## 2019-10-24 RX ADMIN — HYDROXYZINE PAMOATE 50 MG: 25 CAPSULE ORAL at 19:40

## 2019-10-24 RX ADMIN — GABAPENTIN 300 MG: 300 CAPSULE ORAL at 08:36

## 2019-10-24 RX ADMIN — VERAPAMIL HYDROCHLORIDE 180 MG: 180 TABLET, FILM COATED, EXTENDED RELEASE ORAL at 20:44

## 2019-10-24 RX ADMIN — GABAPENTIN 300 MG: 300 CAPSULE ORAL at 20:44

## 2019-10-24 RX ADMIN — GABAPENTIN 300 MG: 300 CAPSULE ORAL at 17:00

## 2019-10-24 RX ADMIN — HYDROXYZINE PAMOATE 50 MG: 25 CAPSULE ORAL at 12:04

## 2019-10-24 RX ADMIN — ARIPIPRAZOLE 2 MG: 2 TABLET ORAL at 08:36

## 2019-10-24 RX ADMIN — ESCITALOPRAM 10 MG: 10 TABLET, FILM COATED ORAL at 08:36

## 2019-10-24 RX ADMIN — LISINOPRIL 20 MG: 20 TABLET ORAL at 08:36

## 2019-10-25 ENCOUNTER — APPOINTMENT (OUTPATIENT)
Dept: PSYCHIATRY | Facility: HOSPITAL | Age: 54
End: 2019-10-25

## 2019-10-25 VITALS
TEMPERATURE: 98.1 F | HEIGHT: 73 IN | SYSTOLIC BLOOD PRESSURE: 93 MMHG | HEART RATE: 63 BPM | OXYGEN SATURATION: 96 % | RESPIRATION RATE: 18 BRPM | WEIGHT: 206.5 LBS | BODY MASS INDEX: 27.37 KG/M2 | DIASTOLIC BLOOD PRESSURE: 56 MMHG

## 2019-10-25 RX ORDER — GABAPENTIN 300 MG/1
300 CAPSULE ORAL 3 TIMES DAILY
Qty: 90 CAPSULE | Refills: 1 | Status: SHIPPED | OUTPATIENT
Start: 2019-10-25

## 2019-10-25 RX ORDER — ARIPIPRAZOLE 2 MG/1
2 TABLET ORAL DAILY
Qty: 30 TABLET | Refills: 1 | Status: SHIPPED | OUTPATIENT
Start: 2019-10-26

## 2019-10-25 RX ADMIN — GABAPENTIN 300 MG: 300 CAPSULE ORAL at 08:56

## 2019-10-25 RX ADMIN — HYDROXYZINE PAMOATE 50 MG: 25 CAPSULE ORAL at 08:55

## 2019-10-25 RX ADMIN — ARIPIPRAZOLE 2 MG: 2 TABLET ORAL at 08:56

## 2019-10-25 RX ADMIN — ESCITALOPRAM 10 MG: 10 TABLET, FILM COATED ORAL at 08:55

## 2019-10-28 ENCOUNTER — TELEPHONE (OUTPATIENT)
Dept: PSYCHIATRY | Facility: HOSPITAL | Age: 54
End: 2019-10-28

## 2019-10-28 ENCOUNTER — APPOINTMENT (OUTPATIENT)
Dept: PSYCHIATRY | Facility: HOSPITAL | Age: 54
End: 2019-10-28

## 2019-10-28 ENCOUNTER — OFFICE VISIT (OUTPATIENT)
Dept: PSYCHIATRY | Facility: HOSPITAL | Age: 54
End: 2019-10-28

## 2019-10-28 DIAGNOSIS — F32.3 CURRENT SEVERE EPISODE OF MAJOR DEPRESSIVE DISORDER WITH PSYCHOTIC FEATURES WITHOUT PRIOR EPISODE (HCC): Primary | ICD-10-CM

## 2019-10-28 PROCEDURE — S9480 INTENSIVE OUTPATIENT PSYCHIA: HCPCS | Performed by: COUNSELOR

## 2019-10-28 NOTE — PROGRESS NOTES
Other     Information/activity     [id number] Art Therapy - Inner/Outer Self collage using magazine pictures    Instructor Antonio Narvaez, [facility]     4:29 PM     Patient Response Good participation

## 2019-10-28 NOTE — PROGRESS NOTES
Group therapy   New to the group today.  Very distraught with much negative self-talk.  Shared of being overwhelmed with anxiety and depression past couple of months since has had three eye surgeries on one of his eyes and is worried he will not regain full use.  Group helped pt connect with others-pt having difficulty with catastrophizing and difficulty seeing how this program can help him.  Others offered support of pt's feelings and encouraged him to keep coming.

## 2019-10-28 NOTE — PROGRESS NOTES
Wrap-up  Day 1 of IOP dealing with depression and anxiety:    Mood at 8  with 10 being the worse    Feeling sad or empty   Trouble with concentration, memory, or making decisions  Fatigue or loss of energy  Loss of interest in things you usually like to do  Increased Irritabliliy  Easily distracted  Changes in normal sleep patterns (increase, decrease, or broken hoours of sleep)  Feelings of worthlessness  Increased nervous feelings/anxiety  Racing thoughts     No SI    With help, able to establish some small goals for later today

## 2019-10-29 ENCOUNTER — APPOINTMENT (OUTPATIENT)
Dept: PSYCHIATRY | Facility: HOSPITAL | Age: 54
End: 2019-10-29

## 2019-10-29 ENCOUNTER — TELEPHONE (OUTPATIENT)
Dept: PSYCHIATRY | Facility: HOSPITAL | Age: 54
End: 2019-10-29

## 2019-10-29 ENCOUNTER — OFFICE VISIT (OUTPATIENT)
Dept: PSYCHIATRY | Facility: HOSPITAL | Age: 54
End: 2019-10-29

## 2019-10-29 DIAGNOSIS — F32.3 CURRENT SEVERE EPISODE OF MAJOR DEPRESSIVE DISORDER WITH PSYCHOTIC FEATURES WITHOUT PRIOR EPISODE (HCC): Primary | ICD-10-CM

## 2019-10-29 PROCEDURE — S9480 INTENSIVE OUTPATIENT PSYCHIA: HCPCS | Performed by: COUNSELOR

## 2019-10-29 NOTE — PROGRESS NOTES
Mental Health Awareness Class   (10:30am-11:30am)  Information/activity     Using the Serenity Prayer for Problem Solving    Instructor Ayleen Hein LCSW     11:36 AM     Patient Response Moderate participation

## 2019-10-29 NOTE — PROGRESS NOTES
Group therapy [id number]  Pt participated in an activity using objects to introduce self to the group.  Discussed good childhood, good marriage and good work life with no previous hx of depression.  Symptoms started a few months ago after eye surgeries and now failing eye sight in one eye. Many current symptoms with much negative thinking.  Pt was attentive and engaged as other group members shared.

## 2019-10-29 NOTE — PROGRESS NOTES
Wrap-up  Mood at 7 with 10 being the worse and found talking about his issues as most helpful today    Feeling sad or empty   Trouble with concentration, memory, or making decisions  Fatigue or loss of energy  Loss of interest in things you usually like to do  Easily distracted  Changes in normal sleep patterns (increase, decrease, or broken hoours of sleep)  Feelings of worthlessness  Increased nervous feelings/anxiety  Racing thoughts     No SI    Appropriate goals for later today

## 2019-10-30 ENCOUNTER — APPOINTMENT (OUTPATIENT)
Dept: PSYCHIATRY | Facility: HOSPITAL | Age: 54
End: 2019-10-30

## 2019-10-30 ENCOUNTER — OFFICE VISIT (OUTPATIENT)
Dept: PSYCHIATRY | Facility: HOSPITAL | Age: 54
End: 2019-10-30

## 2019-10-30 DIAGNOSIS — F32.3 CURRENT SEVERE EPISODE OF MAJOR DEPRESSIVE DISORDER WITH PSYCHOTIC FEATURES WITHOUT PRIOR EPISODE (HCC): Primary | ICD-10-CM

## 2019-10-30 PROCEDURE — S9480 INTENSIVE OUTPATIENT PSYCHIA: HCPCS | Performed by: COUNSELOR

## 2019-10-30 NOTE — PROGRESS NOTES
Group therapy 0900-1020  Continues with much negativity and reports he feels worse, not better. Believes his meds are working.    Is accomplishing small daily goals.  Attentive as others shared.

## 2019-10-30 NOTE — PROGRESS NOTES
Wrap-up  Mood at 7  with 10 being the worse and found Coping Skills Class most helpful today    Feeling sad or empty   Trouble with concentration, memory, or making decisions  Fatigue or loss of energy  Loss of interest in things you usually like to do  Increased Irritabliliy  Changes in normal sleep patterns (increase, decrease, or broken hoours of sleep)  Feelings of worthlessness  Racing thoughts  Decreased need for sleep     No SI    Appropriate goals for later

## 2019-10-30 NOTE — PROGRESS NOTES
Other     Information/activity     Positive Coping Skills Class    Instructor Peggy Cavazos, [facility]     11:43 AM     Patient Response Good participation, added to the discussion

## 2019-10-30 NOTE — PLAN OF CARE
Problem:  Patient Care Overview (Adult)  Goal: Interdisciplinary Rounds/Family Conference  Outcome: Ongoing (interventions implemented as appropriate)   10/30/19 1450   Interdisciplinary Rounds/Family Conf   Participants social work     Treatment team met.  Pt [facility] to [facility] this week.  Has attended 3 days dealing with depresssion and anxiety.  No SI  Continues with several depressive symptoms and decreased mood with much negative self-talk.   Group has encouraged using guided meditation, exercise and more positive self-talk.  Pt has been attentive in classes and groups.  Accomplishing small daily goals.  Wife and friends are supportive.   Will assist with OP providers at d/c.

## 2019-10-31 ENCOUNTER — APPOINTMENT (OUTPATIENT)
Dept: PSYCHIATRY | Facility: HOSPITAL | Age: 54
End: 2019-10-31

## 2019-10-31 ENCOUNTER — OFFICE VISIT (OUTPATIENT)
Dept: PSYCHIATRY | Facility: HOSPITAL | Age: 54
End: 2019-10-31

## 2019-10-31 DIAGNOSIS — F32.3 CURRENT SEVERE EPISODE OF MAJOR DEPRESSIVE DISORDER WITH PSYCHOTIC FEATURES WITHOUT PRIOR EPISODE (HCC): Primary | ICD-10-CM

## 2019-10-31 PROCEDURE — S9480 INTENSIVE OUTPATIENT PSYCHIA: HCPCS

## 2019-10-31 NOTE — PROGRESS NOTES
Group Note:  Pt shared of poor sleep last night, only slept for about 3 hours due to racing thoughts.  He is preoccupied with the health of his eye and all of the possible consequences if it does not heal (loss of job, financial strain, pressure on family).  He reports feeling isolated in his issue because his depression is only related to another health issue and has difficultly relating to others in group.  He reports he did achieve both of his goals yesterday, one of which was to exercise.

## 2019-10-31 NOTE — PROGRESS NOTES
Teaching Record:  Information / activity:  Relationships in Recovery Psychiatric symptoms and Co-occurring disorders    Good participation   [id number]      Yelitza Brandon LCSW

## 2019-11-01 ENCOUNTER — OFFICE VISIT (OUTPATIENT)
Dept: PSYCHIATRY | Facility: HOSPITAL | Age: 54
End: 2019-11-01

## 2019-11-01 ENCOUNTER — APPOINTMENT (OUTPATIENT)
Dept: PSYCHIATRY | Facility: HOSPITAL | Age: 54
End: 2019-11-01

## 2019-11-01 DIAGNOSIS — F32.3 CURRENT SEVERE EPISODE OF MAJOR DEPRESSIVE DISORDER WITH PSYCHOTIC FEATURES WITHOUT PRIOR EPISODE (HCC): Primary | ICD-10-CM

## 2019-11-01 PROCEDURE — S9480 INTENSIVE OUTPATIENT PSYCHIA: HCPCS

## 2019-11-01 NOTE — PROGRESS NOTES
"The patient remains quite dysphoric and continues to ruminate regarding his loss of vision in his right eye.  He is complaining of severe anhedonia and complains that he \"does not feel any better\".  I spoke with him again regarding the latency of onset of action of antidepressant medication and the importance of ongoing purchase patient programming.  He is stating that he is no longer having suicidal ideation and I have pointed to this as a source of progress.  The patient is complaining of poor sleep and I will add Ambien 10 mg at at bedtime in place of trazodone which is been ineffectual for the patient.  "

## 2019-11-01 NOTE — PROGRESS NOTES
Teaching Record: Psych IOP Class  Information / activity:  Safety Planning    Good participation      Bobbi Jain, ASHLEYW

## 2019-11-01 NOTE — PROGRESS NOTES
Group Note:  Pt shared of appointment at eye doctor today, and his thought that it will not go well.  Peer suggested he look into vocational counseling, and pt reported that blindness in one eye probably would not effect his job.  Therapist pointed out this shift in perspective from yesterday when pt was planning to retire early and apply for disability.  Pt remained pessimistic about the outcome of his vision and expressed not wanting to burden his daughter or wife with his depression.  Left group early but plans to return on Monday.

## 2019-11-04 ENCOUNTER — OFFICE VISIT (OUTPATIENT)
Dept: PSYCHIATRY | Facility: HOSPITAL | Age: 54
End: 2019-11-04

## 2019-11-04 ENCOUNTER — APPOINTMENT (OUTPATIENT)
Dept: PSYCHIATRY | Facility: HOSPITAL | Age: 54
End: 2019-11-04

## 2019-11-04 DIAGNOSIS — F32.3 CURRENT SEVERE EPISODE OF MAJOR DEPRESSIVE DISORDER WITH PSYCHOTIC FEATURES WITHOUT PRIOR EPISODE (HCC): Primary | ICD-10-CM

## 2019-11-04 PROCEDURE — S9480 INTENSIVE OUTPATIENT PSYCHIA: HCPCS | Performed by: COUNSELOR

## 2019-11-04 NOTE — PROGRESS NOTES
Wrap-up  Mood at 7  with 10 being the worse and found the class on mindfulness to be most helpful today.    Feeling sad or empty   Trouble with concentration, memory, or making decisions  Fatigue or loss of energy  Loss of interest in things you usually like to do  Changes in normal sleep patterns (increase, decrease, or broken hoours of sleep)  Feelings of worthlessness  Feelings of increased guilt  Increased nervous feelings/anxiety  Racing thoughts    No SI    Requesting help with OP providers    Positive goals for later today

## 2019-11-04 NOTE — PROGRESS NOTES
Other     Information/activity     Mindfulness Class    Instructor Peggy Cavazos, [facility]     1:30 PM     Patient Response Good participation, engaged in the discussion

## 2019-11-04 NOTE — PROGRESS NOTES
Group therapy [id number]  Reported stayed busy this weekend but continues with many depressive symptoms.  Chose to not take the Ambien which MD prescribed on Friday but instead, took the Trazodone.  Reports gets about 6 hours sleep on this.  Continues to worry about how he will function when he returns to work with his bad eyesight.  Was quiet but attentive as others shared today.

## 2019-11-05 ENCOUNTER — APPOINTMENT (OUTPATIENT)
Dept: PSYCHIATRY | Facility: HOSPITAL | Age: 54
End: 2019-11-05

## 2019-11-05 ENCOUNTER — OFFICE VISIT (OUTPATIENT)
Dept: PSYCHIATRY | Facility: HOSPITAL | Age: 54
End: 2019-11-05

## 2019-11-05 DIAGNOSIS — F32.3 CURRENT SEVERE EPISODE OF MAJOR DEPRESSIVE DISORDER WITH PSYCHOTIC FEATURES WITHOUT PRIOR EPISODE (HCC): Primary | ICD-10-CM

## 2019-11-05 PROCEDURE — S9480 INTENSIVE OUTPATIENT PSYCHIA: HCPCS | Performed by: COUNSELOR

## 2019-11-05 NOTE — PROGRESS NOTES
Wrap-up  Mood at 7  with 10 being the worse and found art therapy to be most helpful today    Feeling sad or empty   Trouble with concentration, memory, or making decisions  Fatigue or loss of energy  Loss of interest in things you usually like to do  Easily distracted  Changes in normal sleep patterns ( decrease, or broken hoours of sleep)  Feelings of worthlessness  Feelings of increased guilt  Increased nervous feelings/anxiety  Racing thoughts     Good participation

## 2019-11-05 NOTE — PROGRESS NOTES
Other     Information/activity     [id number] Art Therapy - Therapeutic story The Wall and creation of image of own wall - processed with group    Instructor PAVEL Hercules     3:47 PM     Patient Response Good participation

## 2019-11-05 NOTE — PROGRESS NOTES
Group therapy [id number]  Pt reported successful with small goals yesterday.  Continues to report not finding much alexey in everyday activities but is persistent with doing his goals.  Has a goal to join a gym today.  Was attentive as others shared.

## 2019-11-06 ENCOUNTER — OFFICE VISIT (OUTPATIENT)
Dept: PSYCHIATRY | Facility: HOSPITAL | Age: 54
End: 2019-11-06

## 2019-11-06 ENCOUNTER — APPOINTMENT (OUTPATIENT)
Dept: PSYCHIATRY | Facility: HOSPITAL | Age: 54
End: 2019-11-06

## 2019-11-06 DIAGNOSIS — F32.3 CURRENT SEVERE EPISODE OF MAJOR DEPRESSIVE DISORDER WITH PSYCHOTIC FEATURES WITHOUT PRIOR EPISODE (HCC): Primary | ICD-10-CM

## 2019-11-06 PROCEDURE — S9480 INTENSIVE OUTPATIENT PSYCHIA: HCPCS | Performed by: COUNSELOR

## 2019-11-06 NOTE — PROGRESS NOTES
[id number] Psych IOP Class     Class topic: resilience     Class participation: good; pt actively engaged and shared personal experience

## 2019-11-06 NOTE — PLAN OF CARE
Problem:  Patient Care Overview (Adult)  Goal: Interdisciplinary Rounds/Family Conference  Outcome: Ongoing (interventions implemented as appropriate)   11/06/19 0938   Interdisciplinary Rounds/Family Conf   Participants social work     Treatment team met.  Pt has attended 8 days of IOP dealing with anxiety and depression.  No current SI.  Active in classes and groups.  Continues to report numerous depressive symptoms and anxiety regarding the thoughts of returning to work soon with limited vision.  Has been setting small daily goals and accomplishing them.  Joined a gym and reaching out to wife and friends for support.  Dr. LOPEZ has followed in IOP and medications have been adjusted.  Will assist pt with OP providers at d/c.

## 2019-11-06 NOTE — PROGRESS NOTES
Group therapy   Pt shared was able to accomplish several small goals around the house, joined a gym and exercised there yesterday.  Group supported his efforts.  Trying to help pt practice more positive self-talk.  Pt was attentive as peers shared today.

## 2019-11-06 NOTE — PROGRESS NOTES
Wrap-up  Mood at 6  with 10 being the worse and found class on resilience to be most helpful today    Feeling sad or empty   Trouble with concentration, memory, or making decisions  Fatigue or loss of energy  Loss of interest in things you usually like to do  Changes in normal sleep patterns (increase, decrease, or broken hoours of sleep)  Feelings of worthlessness  Feelings of increased guilt  Increased nervous feelings/anxiety    No SI    Appropriate goals for later today

## 2019-11-07 ENCOUNTER — APPOINTMENT (OUTPATIENT)
Dept: PSYCHIATRY | Facility: HOSPITAL | Age: 54
End: 2019-11-07

## 2019-11-07 ENCOUNTER — OFFICE VISIT (OUTPATIENT)
Dept: PSYCHIATRY | Facility: HOSPITAL | Age: 54
End: 2019-11-07

## 2019-11-07 DIAGNOSIS — F32.3 CURRENT SEVERE EPISODE OF MAJOR DEPRESSIVE DISORDER WITH PSYCHOTIC FEATURES WITHOUT PRIOR EPISODE (HCC): Primary | ICD-10-CM

## 2019-11-07 PROCEDURE — S9480 INTENSIVE OUTPATIENT PSYCHIA: HCPCS

## 2019-11-07 NOTE — PROGRESS NOTES
Wrap Up:  Pt rates mood 6 out of 10 and reports group discussion was most helpful today.  He reports following symptoms:  Feeling sad/empty  Trouble with concentration, memory or making decisions  Fatigue  Loss of interest in things you usually like to do  Decreased sleep  Feelings of worthlessness  Feelings of increased guilt  Increased anxiety    Pt denies SI and has appropriate goals for the day.  He plans to discharge from [facility] tomorrow.

## 2019-11-07 NOTE — PROGRESS NOTES
The patient's major complaint at this point is anhedonia and ongoing frustration at his lack of progress.  Staff is reported that the patient seems to be doing somewhat better and is planning to return to work on 21 November.  I will go ahead and increase the patient's Lexapro dose to 20 mg, as we do seem to see a signal response at this point.

## 2019-11-07 NOTE — PROGRESS NOTES
Group Note:  Pt's affect was a bit brighter today, he smiled and made several jokes with peers.  He reports continuing to struggle with many depressive symptoms, and recognizes it is within his power to make changes in his life to help cope with these symptoms.  Pt reports he has been exercising daily and doing chores around the house to implement more structure in his day.  He anticipates going to back to work will be extremely challenging, but plans to do so anyway because he needs the health insurance.  He also reports feeling some hope that his depression may improve.

## 2019-11-08 ENCOUNTER — OFFICE VISIT (OUTPATIENT)
Dept: PSYCHIATRY | Facility: HOSPITAL | Age: 54
End: 2019-11-08

## 2019-11-08 ENCOUNTER — APPOINTMENT (OUTPATIENT)
Dept: PSYCHIATRY | Facility: HOSPITAL | Age: 54
End: 2019-11-08

## 2019-11-08 DIAGNOSIS — F32.3 CURRENT SEVERE EPISODE OF MAJOR DEPRESSIVE DISORDER WITH PSYCHOTIC FEATURES WITHOUT PRIOR EPISODE (HCC): Primary | ICD-10-CM

## 2019-11-08 PROCEDURE — S9480 INTENSIVE OUTPATIENT PSYCHIA: HCPCS

## 2019-11-08 NOTE — PROGRESS NOTES
Group Note:  Today is pt's last day, and he processed changes he has made since starting IOP.  He reports his wife has commented that he is doing better, but he is not sure how much improved he is.  Therapist highlighted that pt is now able to laugh, smile, and has verbalized having more hope for the future.  Pt agrees he is feeling more hopeful and is looking forward to getting back to work and a normal routine.

## 2019-11-08 NOTE — PROGRESS NOTES
Teaching Record:  Information / activity:  Coping with Anxiety    Good participation      Bobbi Jain, [facility]

## 2019-11-08 NOTE — PROGRESS NOTES
Discharge Summary    Days in IOP: 10  Start date: 10/28/19  Discharge Date: 11/8/19    Summary of Treatment and Progress: Pt attending IOP after d/c from [facility] post self harm.  Pt's depression began after having a detached retina and subsequent loss of vision in his right eye. He was engaged in group process and initially had negative thinking and pessimistic outlook.  At time of discharge, pt was expressing hope for the future and feeling more like himself again.  He incorporated positive coping skills such as daily exercise, keeping busy with projects around the house, and practicing positive self talk.  He has good family support and is looking forward to returning to work.      Discharge Diagnosis: Major Depressive Disorder, severe, with psychotic features without prior episode    Discharge Medications:  Gabapentin 300mg TID  Lexapro 20mg daily  Vistaril 50mg prn every 4 hours  Ambilify 2mg daily  Trazodone 50mg prn nightly    Aftercare:  Referral sent to [facility] for medication management with Dr. Scruggs and therapy.

## 2019-11-08 NOTE — PROGRESS NOTES
Teaching Record:  Information / activity:  Process Addictions/co-occurring disorders    Moderate participation   [id number]      Yelitza Brandon LCSW

## 2019-11-11 ENCOUNTER — APPOINTMENT (OUTPATIENT)
Dept: PSYCHIATRY | Facility: HOSPITAL | Age: 54
End: 2019-11-11

## 2019-11-12 ENCOUNTER — APPOINTMENT (OUTPATIENT)
Dept: PSYCHIATRY | Facility: HOSPITAL | Age: 54
End: 2019-11-12

## 2019-11-13 ENCOUNTER — APPOINTMENT (OUTPATIENT)
Dept: PSYCHIATRY | Facility: HOSPITAL | Age: 54
End: 2019-11-13

## 2019-11-14 ENCOUNTER — APPOINTMENT (OUTPATIENT)
Dept: PSYCHIATRY | Facility: HOSPITAL | Age: 54
End: 2019-11-14

## 2019-11-15 ENCOUNTER — APPOINTMENT (OUTPATIENT)
Dept: PSYCHIATRY | Facility: HOSPITAL | Age: 54
End: 2019-11-15

## 2019-11-18 ENCOUNTER — APPOINTMENT (OUTPATIENT)
Dept: PSYCHIATRY | Facility: HOSPITAL | Age: 54
End: 2019-11-18

## 2019-11-19 ENCOUNTER — APPOINTMENT (OUTPATIENT)
Dept: PSYCHIATRY | Facility: HOSPITAL | Age: 54
End: 2019-11-19

## 2019-11-20 ENCOUNTER — APPOINTMENT (OUTPATIENT)
Dept: PSYCHIATRY | Facility: HOSPITAL | Age: 54
End: 2019-11-20

## 2019-11-21 ENCOUNTER — APPOINTMENT (OUTPATIENT)
Dept: PSYCHIATRY | Facility: HOSPITAL | Age: 54
End: 2019-11-21

## 2019-11-22 ENCOUNTER — APPOINTMENT (OUTPATIENT)
Dept: PSYCHIATRY | Facility: HOSPITAL | Age: 54
End: 2019-11-22

## 2020-09-01 ENCOUNTER — TELEPHONE (OUTPATIENT)
Dept: NEUROSURGERY | Facility: CLINIC | Age: 55
End: 2020-09-01

## 2020-09-01 NOTE — TELEPHONE ENCOUNTER
Increased leg pain over the past few weeks.  He was unable to get MRI and EMG ordered October last year 2019.     He will need an follow up appointment with one of our providers for exam and to order updated tests if needed.     Appointment scheduled, patient notified.    Previous surgeries by Dr. Garza.  2001 and 2014.

## 2020-09-01 NOTE — TELEPHONE ENCOUNTER
PATIENT IS FINALLY READY TO PROCEED WITH TESTING.  HIS ORDERS FOR THE EMG AND MRI WILL  ON 10-1-2020.  DUE TO SCHEDULING, TEST WILL NOT BE ABLE TO BE SCHEDULED BEFORE THAT DATE.  REASON FOR THE DELAY WAS PATIENT HAD A EMERGENCY TO HAVE SURGERIES ON HIS EYES TO SAVE HIS VISION AND WAS UNABLE TO PROCEED WITH FOLLOWING UP WITH OTHER HEALTH ISSUES.  PLEASE REVIEW LAST NOTE IN OCT 2019 AND ADVISE PATIENT ON ORDERS.    [phone]

## 2021-03-26 ENCOUNTER — BULK ORDERING (OUTPATIENT)
Dept: CASE MANAGEMENT | Facility: OTHER | Age: 56
End: 2021-03-26

## 2021-03-26 DIAGNOSIS — Z23 IMMUNIZATION DUE: ICD-10-CM

## 2021-07-23 ENCOUNTER — TELEPHONE (OUTPATIENT)
Dept: GASTROENTEROLOGY | Facility: CLINIC | Age: 56
End: 2021-07-23

## 2023-11-01 ENCOUNTER — OFFICE VISIT (OUTPATIENT)
Dept: SURGERY | Facility: CLINIC | Age: 58
End: 2023-11-01
Payer: COMMERCIAL

## 2023-11-01 ENCOUNTER — PREP FOR SURGERY (OUTPATIENT)
Dept: OTHER | Facility: HOSPITAL | Age: 58
End: 2023-11-01
Payer: COMMERCIAL

## 2023-11-01 VITALS
SYSTOLIC BLOOD PRESSURE: 105 MMHG | BODY MASS INDEX: 29.16 KG/M2 | DIASTOLIC BLOOD PRESSURE: 68 MMHG | WEIGHT: 220 LBS | HEIGHT: 73 IN

## 2023-11-01 DIAGNOSIS — Z86.010 HISTORY OF COLON POLYPS: Primary | ICD-10-CM

## 2023-11-01 DIAGNOSIS — Z80.0 FAMILY HISTORY OF COLON CANCER: ICD-10-CM

## 2023-11-01 DIAGNOSIS — D50.9 IRON DEFICIENCY ANEMIA, UNSPECIFIED IRON DEFICIENCY ANEMIA TYPE: ICD-10-CM

## 2023-11-01 PROBLEM — M51.369 DISC DEGENERATION, LUMBAR: Status: ACTIVE | Noted: 2019-09-30

## 2023-11-01 PROBLEM — E61.1 IRON DEFICIENCY: Status: ACTIVE | Noted: 2023-10-24

## 2023-11-01 PROBLEM — I10 ESSENTIAL HYPERTENSION: Status: ACTIVE | Noted: 2018-01-05

## 2023-11-01 PROBLEM — M51.36 DISC DEGENERATION, LUMBAR: Status: ACTIVE | Noted: 2019-09-30

## 2023-11-01 PROBLEM — Z86.0100 HISTORY OF COLON POLYPS: Status: ACTIVE | Noted: 2023-11-01

## 2023-11-01 PROBLEM — R73.03 PREDIABETES: Status: ACTIVE | Noted: 2019-02-25

## 2023-11-01 PROBLEM — H33.20 RETINAL DETACHMENT: Status: ACTIVE | Noted: 2019-10-07

## 2023-11-01 PROBLEM — E55.9 VITAMIN D DEFICIENCY: Status: ACTIVE | Noted: 2018-01-05

## 2023-11-01 PROBLEM — E78.2 MIXED HYPERLIPIDEMIA: Status: ACTIVE | Noted: 2018-01-15

## 2023-11-01 PROBLEM — K76.0 STEATOSIS OF LIVER: Status: ACTIVE | Noted: 2019-02-25

## 2023-11-01 PROCEDURE — 99203 OFFICE O/P NEW LOW 30 MIN: CPT | Performed by: SURGERY

## 2023-11-01 RX ORDER — ATORVASTATIN CALCIUM 20 MG/1
1 TABLET, FILM COATED ORAL DAILY
COMMUNITY
Start: 2015-06-20

## 2023-11-01 RX ORDER — FERROUS SULFATE 325(65) MG
TABLET ORAL
COMMUNITY
Start: 2023-10-24

## 2023-11-01 NOTE — PROGRESS NOTES
General Surgery Initial Office Visit    Referring Provider: Luis Armando Bolanos MD    Chief Complaint:    Iron deficiency, history of colon polyps    History of Present Illness:    Ochoa Mojica is a 58 y.o. male who was referred for iron deficiency anemia and history of colon polyps.  He states his last colonoscopy was in  with Dr. Bansal and he had multiple polyps removed.  He was supposed to return in 5 years but did not.  He denies any changes in his bowel movements.  He denies abdominal pain, nausea, vomiting, blood in his stools, dark-colored stools.  He denies any significant weight loss.    Past Medical History:   Colon polyps  Hypertension  Hyperlipidemia    Past Surgical History:    Back surgery  Colonoscopy-  Incision and drainage right arm  Retinal detachment surgery-2019  Tonsillectomy    Family History:    Reports history of colon cancer in 2 paternal uncles  Reports his father  with cancer everywhere, but the initial source was not colon cancer    Social History:      Denies tobacco use  Rare alcohol use    Allergies:   No Known Allergies    Medications:     Current Outpatient Medications:     atorvastatin (LIPITOR) 20 MG tablet, Take 1 tablet by mouth Daily., Disp: , Rfl:     FeroSul 325 (65 Fe) MG tablet, , Disp: , Rfl:     lisinopril (PRINIVIL,ZESTRIL) 20 MG tablet, Take 1 tablet by mouth Daily. Indications: High Blood Pressure Disorder, Disp: , Rfl:     verapamil SR (CALAN-SR) 180 MG CR tablet, Take 1 tablet by mouth Every Night. Indications: High Blood Pressure of Unknown Cause, Disp: , Rfl:     Radiology/Endoscopy:    None to review    Labs:    Unable to see most recent labs    Review of Systems:   Influenza-like illness: no fever, no  cough, no  sore throat, no  body aches, no loss of sense of taste or smell, no known exposure to person with Covid-19.  Constitutional: Negative for fevers or chills  HENT: Negative for hearing loss or runny nose  Eyes: Negative for vision  changes or scleral icterus  Respiratory: Negative for cough or shortness of breath  Cardiovascular: Negative for chest pain or heart palpitations  Gastrointestinal:  Negative for abdominal pain, nausea, vomiting, constipation, melena, or hematochezia  Genitourinary: Negative for hematuria or dysuria  Musculoskeletal: Negative for joint swelling or gait instability  Neurologic: Negative for tremors or seizures  Psychiatric: Negative for suicidal ideations or depression  All other systems reviewed and negative    Physical Exam:   Body mass index is 29.03 kg/m².  Constitutional: Well-developed well-nourished, no acute distress  Eyes: Conjunctiva normal, sclera nonicteric  ENMT: Hearing grossly normal, oral mucosa moist  Neck: Supple, trachea midline  Respiratory: No increased work of breathing, normal inspiratory effort  Cardiovascular: Regular rate, no peripheral edema, no jugular venous distention  Gastrointestinal: Soft, nontender, nondistended  Skin:  Warm, dry, no rash on visualized skin surfaces  Musculoskeletal: Symmetric strength, normal gait  Psychiatric: Alert and oriented ×3, normal affect     Assessment/Plan:  1.  Iron deficiency anemia  2.  Personal history of colon polyps  3.  Family history of colon cancer in second-degree relatives    Because of his family history and personal history of polyps, I recommended proceeding with colonoscopy. Indications, risks and procedure were discussed with Him including but not limited to bleeding, infection, possibility of perforation and possible polypectomy. All of their questions were answered and  would like to proceed with the above recommendations.  Any additional follow-up will be discussed with Him after the results have been reviewed.       SHAWN SIEGEL M.D.  General, Robotic, and Endoscopic Surgery  [facility]    [address] [place] [address], [address]  [place], [place], [facility]  P: [phone]  F: [phone]

## 2023-11-03 ENCOUNTER — PATIENT ROUNDING (BHMG ONLY) (OUTPATIENT)
Dept: SURGERY | Facility: CLINIC | Age: 58
End: 2023-11-03
Payer: COMMERCIAL

## 2023-11-03 NOTE — PROGRESS NOTES
November 3, 2023    Hello, may I speak with Ochoa Mojica?    My name is Pool      I am  with [facility]  [address]  [facility] [address] [phone].    Before we get started may I verify your date of birth? 1965    I am calling to officially welcome you to our practice and ask about your recent visit. Is this a good time to talk? yes    Tell me about your visit with us. What things went well?  Everything was fine.       We're always looking for ways to make our patients' experiences even better. Do you have recommendations on ways we may improve?  no    Overall were you satisfied with your first visit to our practice? yes       I appreciate you taking the time to speak with me today. Is there anything else I can do for you? no      Thank you, and have a great day.

## 2023-12-01 ENCOUNTER — TELEPHONE (OUTPATIENT)
Dept: SURGERY | Facility: CLINIC | Age: 58
End: 2023-12-01
Payer: COMMERCIAL

## 2023-12-01 NOTE — TELEPHONE ENCOUNTER
Left message informing patient of updated arrival time for 12/8. Patient will now need to arrive at 10 am. I did ask the patient to call our office back to confirm. A [employer] message was also sent to the patient.

## 2023-12-07 NOTE — SIGNIFICANT NOTE
Education provided the Patient on the following:    - Nothing to Eat or Drink after MN the night before the procedure    - Avoid red/purple fluids while completing their bowel prep as ordered by physician  -Contact Gastrointerologist office for any questions about specific details regarding colon prep    -You will need to have someone drive you home after your colonoscopy and remain with you for 24 hours after the procedure  - The date of your Surgery, you may have one visitor at bedside or within 10-15 minutes of [facility]  -Please wear warm socks when you arrive for your colonoscopy  -Remove all jewelry and leave any valuables before arriving the day of your procedure (all will have to be removed before leaving preop)  -You will need to arrive at 0930 on 12/8/23 for your colonoscopy    -Feel free to contact us at: [phone] with any additional questions/concerns

## 2023-12-07 NOTE — SIGNIFICANT NOTE
Education provided the Patient on the following:    - Nothing to Eat or Drink after MN the night before the procedure    - Avoid red/purple fluids while completing their bowel prep as ordered by physician  -Contact Gastrointerologist office for any questions about specific details regarding colon prep    -You will need to have someone drive you home after your colonoscopy and remain with you for 24 hours after the procedure  - The date of your Surgery, you may have one visitor at bedside or within 10-15 minutes of [facility]  -Please wear warm socks when you arrive for your colonoscopy  -Remove all jewelry and leave any valuables before arriving the day of your procedure (all will have to be removed before leaving preop)  -You will need to arrive at 0930 on 12 7/23 for your colonoscopy    -Feel free to contact us at: [phone] with any additional questions/concerns

## 2023-12-08 ENCOUNTER — HOSPITAL ENCOUNTER (OUTPATIENT)
Facility: SURGERY CENTER | Age: 58
Setting detail: HOSPITAL OUTPATIENT SURGERY
Discharge: HOME | End: 2023-12-08
Attending: SURGERY | Admitting: SURGERY
Payer: COMMERCIAL

## 2023-12-08 ENCOUNTER — ANESTHESIA EVENT (OUTPATIENT)
Dept: SURGERY | Facility: SURGERY CENTER | Age: 58
End: 2023-12-08
Payer: COMMERCIAL

## 2023-12-08 ENCOUNTER — ANESTHESIA (OUTPATIENT)
Dept: SURGERY | Facility: SURGERY CENTER | Age: 58
End: 2023-12-08
Payer: COMMERCIAL

## 2023-12-08 VITALS
TEMPERATURE: 97.7 F | WEIGHT: 213 LBS | OXYGEN SATURATION: 97 % | BODY MASS INDEX: 28.23 KG/M2 | HEART RATE: 58 BPM | DIASTOLIC BLOOD PRESSURE: 75 MMHG | SYSTOLIC BLOOD PRESSURE: 113 MMHG | RESPIRATION RATE: 16 BRPM | HEIGHT: 73 IN

## 2023-12-08 PROCEDURE — 25010000002 LIDOCAINE 1 % SOLUTION: Performed by: ANESTHESIOLOGY

## 2023-12-08 PROCEDURE — 25010000002 PROPOFOL 10 MG/ML EMULSION: Performed by: ANESTHESIOLOGY

## 2023-12-08 PROCEDURE — 45378 DIAGNOSTIC COLONOSCOPY: CPT | Performed by: SURGERY

## 2023-12-08 PROCEDURE — S0260 H&P FOR SURGERY: HCPCS | Performed by: SURGERY

## 2023-12-08 PROCEDURE — 25810000003 LACTATED RINGERS PER 1000 ML: Performed by: SURGERY

## 2023-12-08 RX ORDER — ONDANSETRON 2 MG/ML
4 INJECTION INTRAMUSCULAR; INTRAVENOUS ONCE AS NEEDED
Status: DISCONTINUED | OUTPATIENT
Start: 2023-12-08 | End: 2023-12-08 | Stop reason: HOSPADM

## 2023-12-08 RX ORDER — LABETALOL HYDROCHLORIDE 5 MG/ML
5 INJECTION, SOLUTION INTRAVENOUS
Status: DISCONTINUED | OUTPATIENT
Start: 2023-12-08 | End: 2023-12-08 | Stop reason: HOSPADM

## 2023-12-08 RX ORDER — LIDOCAINE HYDROCHLORIDE 10 MG/ML
0.5 INJECTION, SOLUTION INFILTRATION; PERINEURAL ONCE AS NEEDED
Status: DISCONTINUED | OUTPATIENT
Start: 2023-12-08 | End: 2023-12-08 | Stop reason: HOSPADM

## 2023-12-08 RX ORDER — FENTANYL CITRATE 50 UG/ML
25 INJECTION, SOLUTION INTRAMUSCULAR; INTRAVENOUS
Status: DISCONTINUED | OUTPATIENT
Start: 2023-12-08 | End: 2023-12-08 | Stop reason: HOSPADM

## 2023-12-08 RX ORDER — SODIUM CHLORIDE, SODIUM LACTATE, POTASSIUM CHLORIDE, CALCIUM CHLORIDE 600; 310; 30; 20 MG/100ML; MG/100ML; MG/100ML; MG/100ML
1000 INJECTION, SOLUTION INTRAVENOUS CONTINUOUS
Status: DISCONTINUED | OUTPATIENT
Start: 2023-12-08 | End: 2023-12-08 | Stop reason: HOSPADM

## 2023-12-08 RX ORDER — HYDRALAZINE HYDROCHLORIDE 20 MG/ML
10 INJECTION INTRAMUSCULAR; INTRAVENOUS
Status: DISCONTINUED | OUTPATIENT
Start: 2023-12-08 | End: 2023-12-08 | Stop reason: HOSPADM

## 2023-12-08 RX ORDER — LIDOCAINE HYDROCHLORIDE 10 MG/ML
INJECTION, SOLUTION INFILTRATION; PERINEURAL AS NEEDED
Status: DISCONTINUED | OUTPATIENT
Start: 2023-12-08 | End: 2023-12-08 | Stop reason: SURG

## 2023-12-08 RX ORDER — ALFUZOSIN HYDROCHLORIDE 10 MG/1
10 TABLET, EXTENDED RELEASE ORAL DAILY
COMMUNITY

## 2023-12-08 RX ORDER — PROPOFOL 10 MG/ML
VIAL (ML) INTRAVENOUS AS NEEDED
Status: DISCONTINUED | OUTPATIENT
Start: 2023-12-08 | End: 2023-12-08 | Stop reason: SURG

## 2023-12-08 RX ORDER — SODIUM CHLORIDE 0.9 % (FLUSH) 0.9 %
10 SYRINGE (ML) INJECTION AS NEEDED
Status: DISCONTINUED | OUTPATIENT
Start: 2023-12-08 | End: 2023-12-08 | Stop reason: HOSPADM

## 2023-12-08 RX ADMIN — SODIUM CHLORIDE, POTASSIUM CHLORIDE, SODIUM LACTATE AND CALCIUM CHLORIDE 1000 ML: 600; 310; 30; 20 INJECTION, SOLUTION INTRAVENOUS at 11:49

## 2023-12-08 RX ADMIN — PROPOFOL 100 MG: 10 INJECTION, EMULSION INTRAVENOUS at 13:14

## 2023-12-08 RX ADMIN — LIDOCAINE HYDROCHLORIDE 30 MG: 10 INJECTION, SOLUTION INFILTRATION; PERINEURAL at 13:14

## 2023-12-08 RX ADMIN — PROPOFOL 160 MCG/KG/MIN: 10 INJECTION, EMULSION INTRAVENOUS at 13:14

## 2023-12-08 NOTE — ANESTHESIA POSTPROCEDURE EVALUATION
"Patient: Ochoa Mojica    Procedure Summary       Date: 12/08/23 Room / Location: [facility] OR 06 / [facility]    Anesthesia Start: 1311 Anesthesia Stop: 1355    Procedure: COLONOSCOPY to Cecum Diagnosis:       History of colon polyps      (History of colon polyps [Z86.010])    Surgeons: Anjelica Deras MD Provider: Yuval Eddy MD    Anesthesia Type: MAC ASA Status: 2            Anesthesia Type: MAC    Vitals  Vitals Value Taken Time   /81 12/08/23 1311   Temp     Pulse     Resp     SpO2     Vitals shown include unfiled device data.        Post Anesthesia Care and Evaluation    Patient location during evaluation: bedside  Pain management: adequate    Airway patency: patent  Anesthetic complications: No anesthetic complications    Cardiovascular status: acceptable  Respiratory status: acceptable  Hydration status: acceptable    Comments: */81 (BP Location: Left arm, Patient Position: Lying)   Pulse 74   Temp 36.4 °C (97.5 °F) (Temporal)   Resp 16   Ht 185.4 cm (73\")   Wt 96.6 kg (213 lb)   SpO2 99%   BMI 28.10 kg/m²       "

## 2023-12-08 NOTE — H&P
Referring Provider: Luis Armando Bolanos MD     Chief Complaint:    Iron deficiency, history of colon polyps     History of Present Illness:    Ochoa Mojica is a 58 y.o. male who was referred for iron deficiency anemia and history of colon polyps.  He states his last colonoscopy was in  with Dr. Bansal and he had multiple polyps removed.  He was supposed to return in 5 years but did not.  He denies any changes in his bowel movements.  He denies abdominal pain, nausea, vomiting, blood in his stools, dark-colored stools.  He denies any significant weight loss.     Past Medical History:   Colon polyps  Hypertension  Hyperlipidemia     Past Surgical History:    Back surgery  Colonoscopy-  Incision and drainage right arm  Retinal detachment surgery-2019  Tonsillectomy     Family History:    Reports history of colon cancer in 2 paternal uncles  Reports his father  with cancer everywhere, but the initial source was not colon cancer     Social History:      Denies tobacco use  Rare alcohol use     Allergies:   No Known Allergies     Medications:      Current Outpatient Medications:     atorvastatin (LIPITOR) 20 MG tablet, Take 1 tablet by mouth Daily., Disp: , Rfl:     FeroSul 325 (65 Fe) MG tablet, , Disp: , Rfl:     lisinopril (PRINIVIL,ZESTRIL) 20 MG tablet, Take 1 tablet by mouth Daily. Indications: High Blood Pressure Disorder, Disp: , Rfl:     verapamil SR (CALAN-SR) 180 MG CR tablet, Take 1 tablet by mouth Every Night. Indications: High Blood Pressure of Unknown Cause, Disp: , Rfl:      Radiology/Endoscopy:    None to review     Labs:    Unable to see most recent labs     Review of Systems:   Influenza-like illness: no fever, no  cough, no  sore throat, no  body aches, no loss of sense of taste or smell, no known exposure to person with Covid-19.  Constitutional: Negative for fevers or chills  HENT: Negative for hearing loss or runny nose  Eyes: Negative for vision changes or scleral  icterus  Respiratory: Negative for cough or shortness of breath  Cardiovascular: Negative for chest pain or heart palpitations  Gastrointestinal:  Negative for abdominal pain, nausea, vomiting, constipation, melena, or hematochezia  Genitourinary: Negative for hematuria or dysuria  Musculoskeletal: Negative for joint swelling or gait instability  Neurologic: Negative for tremors or seizures  Psychiatric: Negative for suicidal ideations or depression  All other systems reviewed and negative     Physical Exam:   Body mass index is 29.03 kg/m².  Constitutional: Well-developed well-nourished, no acute distress  Eyes: Conjunctiva normal, sclera nonicteric  ENMT: Hearing grossly normal, oral mucosa moist  Neck: Supple, trachea midline  Respiratory: No increased work of breathing, normal inspiratory effort  Cardiovascular: Regular rate, no peripheral edema, no jugular venous distention  Gastrointestinal: Soft, nontender, nondistended  Skin:  Warm, dry, no rash on visualized skin surfaces  Musculoskeletal: Symmetric strength, normal gait  Psychiatric: Alert and oriented ×3, normal affect      Assessment/Plan:  1.  Iron deficiency anemia  2.  Personal history of colon polyps  3.  Family history of colon cancer in second-degree relatives     Because of his family history and personal history of polyps, I recommended proceeding with colonoscopy. Indications, risks and procedure were discussed with Him including but not limited to bleeding, infection, possibility of perforation and possible polypectomy. All of their questions were answered and  would like to proceed with the above recommendations.  Any additional follow-up will be discussed with Him after the results have been reviewed.         SHAWN SIEGEL M.D.  General, Robotic, and Endoscopic Surgery  [facility]     [address], [address]  [place], [place], [address]  P: [phone]  F: [phone]

## 2023-12-08 NOTE — ANESTHESIA PREPROCEDURE EVALUATION
Anesthesia Evaluation     Patient summary reviewed and Nursing notes reviewed                Airway   Mallampati: II  Dental - normal exam     Pulmonary - negative pulmonary ROS and normal exam   (-) not a smoker  Cardiovascular - normal exam    ECG reviewed    (+) hypertension 2 medications or greater, hyperlipidemia      Neuro/Psych  (+) psychiatric history Depression  GI/Hepatic/Renal/Endo    (+) liver disease fatty liver disease  (-) diabetes, no thyroid disorder    Musculoskeletal     Abdominal    Substance History      OB/GYN          Other   arthritis,                 Anesthesia Plan    ASA 2     MAC       Anesthetic plan, risks, benefits, and alternatives have been provided, discussed and informed consent has been obtained with: patient.    CODE STATUS:

## 2023-12-08 NOTE — OP NOTE
Jhony Gil Patient Age: 16 year old  MESSAGE: Interpreting service used: No    Insurance on file confirmed with caller: Yes    IM/FP- Symptom-  Adult-  Yellow Symptom-     Depression- new onset or worsening    Also asking for a blood panel to be ordered        Appointment: Patient scheduled an appointment on 12-21  at 850 a.m. with dr rutherford  at [place].     Caller connected to triage- Yes- [facility]- Connect call to Triage queue- Route message to provider's clinical support pool    Message read back to caller for accuracy: Yes       ALLERGIES:  Patient has no known allergies.  Current Outpatient Medications   Medication Sig Dispense Refill   • lisdexamfetamine (Vyvanse) 50 MG capsule Take 1 capsule by mouth every morning. Indications: Attention Deficit Hyperactivity Disorder 30 capsule 0     No current facility-administered medications for this visit.     PHARMACY to use:           Pharmacy preference(s) on file:   [id number] - [place], [place] - [address]  [address] [address]  Phone: [phone] Fax: [phone]      CALL BACK INFO: Ok to leave response (including medical information) on answering machine      PCP: Esau Rutherford MD         INS: Payor: BLUE [employer]UE [employer]ELD [place] / Plan: PPO [id number] / Product Type: PPO MISC   PATIENT ADDRESS:  [address]  [place] [address]     PREOPERATIVE DIAGNOSIS:  Family history of colon cancer  Personal history of colon polyps    POSTOPERATIVE DIAGNOSIS AND FINDINGS:  Normal mucosa  Diverticulosis    PROCEDURE:  Colonoscopy to cecum     SURGEON:  Shawn Siegel MD    ANESTHESIA:  MAC    SPECIMEN(S):  none    DESCRIPTION:  In the decubitus position, a digital rectal exam was performed and was normal. The colonoscope was inserted under direct visualization of the lumen to the cecum, which was confirmed by visualization of the ileocecal valve and appendiceal orifice. The scope was then slowly withdrawn circumferentially examining all mucosal surfaces. There were no mucosal abnormalities. Scattered diverticulosis seen. The quality of the bowel preparation was good. The patient tolerated the procedure well.    RECOMMENDATION FOR FUTURE SURVEILLANCE:  5 years    SHAWN SIEGEL MD  General, Robotic and Endoscopic Surgery  [facility]    [address], [address]  [place], [place], [address]  P: [phone]  F: [phone]

## 2024-09-07 RX ORDER — FERROUS SULFATE 325(65) MG
1 TABLET ORAL DAILY
Qty: 90 TABLET | Refills: 0 | Status: SHIPPED | OUTPATIENT
Start: 2024-09-07

## 2024-09-20 DIAGNOSIS — R73.03 PREDIABETES: Primary | ICD-10-CM

## 2024-09-21 LAB
BUN SERPL-MCNC: 20 MG/DL (ref 8–27)
BUN/CREAT SERPL: 23 (ref 10–24)
CALCIUM SERPL-MCNC: 9.4 MG/DL (ref 8.6–10.2)
CHLORIDE SERPL-SCNC: 104 MMOL/L (ref 96–106)
CO2 SERPL-SCNC: 20 MMOL/L (ref 20–29)
CREAT SERPL-MCNC: 0.88 MG/DL (ref 0.76–1.27)
EGFRCR SERPLBLD CKD-EPI 2021: 98 ML/MIN/1.73
GLUCOSE SERPL-MCNC: 112 MG/DL (ref 70–99)
HBA1C MFR BLD: 6.3 % (ref 4.8–5.6)
POTASSIUM SERPL-SCNC: 4.3 MMOL/L (ref 3.5–5.2)
SODIUM SERPL-SCNC: 139 MMOL/L (ref 134–144)

## 2024-09-26 ENCOUNTER — OFFICE VISIT (OUTPATIENT)
Age: 59
End: 2024-09-26
Payer: COMMERCIAL

## 2024-09-26 VITALS
HEART RATE: 70 BPM | BODY MASS INDEX: 27.57 KG/M2 | WEIGHT: 208 LBS | TEMPERATURE: 97.4 F | OXYGEN SATURATION: 96 % | HEIGHT: 73 IN | DIASTOLIC BLOOD PRESSURE: 72 MMHG | SYSTOLIC BLOOD PRESSURE: 128 MMHG | RESPIRATION RATE: 14 BRPM

## 2024-09-26 DIAGNOSIS — E78.2 MIXED HYPERLIPIDEMIA: ICD-10-CM

## 2024-09-26 DIAGNOSIS — D50.9 IRON DEFICIENCY ANEMIA, UNSPECIFIED IRON DEFICIENCY ANEMIA TYPE: ICD-10-CM

## 2024-09-26 DIAGNOSIS — I10 ESSENTIAL HYPERTENSION: ICD-10-CM

## 2024-09-26 DIAGNOSIS — R53.83 OTHER FATIGUE: Primary | ICD-10-CM

## 2024-09-26 PROBLEM — M54.50 CHRONIC LOW BACK PAIN: Status: ACTIVE | Noted: 2019-10-07

## 2024-09-26 PROBLEM — Z91.89 AT RISK FOR CARDIOVASCULAR EVENT: Status: ACTIVE | Noted: 2023-07-10

## 2024-09-26 PROBLEM — G89.29 CHRONIC LOW BACK PAIN: Status: ACTIVE | Noted: 2019-10-07

## 2024-09-26 PROBLEM — H43.819 VITREOUS DETACHMENT: Status: ACTIVE | Noted: 2019-07-11

## 2024-09-26 PROCEDURE — 99204 OFFICE O/P NEW MOD 45 MIN: CPT | Performed by: FAMILY MEDICINE

## 2024-09-26 RX ORDER — PRIMIDONE 50 MG/1
TABLET ORAL
COMMUNITY
Start: 2024-09-08

## 2024-09-28 LAB
CHOLEST SERPL-MCNC: 114 MG/DL (ref 100–199)
FERRITIN SERPL-MCNC: 52 NG/ML (ref 30–400)
HDLC SERPL-MCNC: 35 MG/DL
LDLC SERPL CALC-MCNC: 66 MG/DL (ref 0–99)
TRIGL SERPL-MCNC: 60 MG/DL (ref 0–149)
VLDLC SERPL CALC-MCNC: 13 MG/DL (ref 5–40)

## 2024-10-06 DIAGNOSIS — Z76.0 ISSUE OF REPEAT PRESCRIPTION FOR MEDICATION: Primary | ICD-10-CM

## 2024-10-07 RX ORDER — VERAPAMIL HYDROCHLORIDE 180 MG/1
180 TABLET, EXTENDED RELEASE ORAL DAILY
Qty: 90 TABLET | Refills: 2 | Status: SHIPPED | OUTPATIENT
Start: 2024-10-07

## 2024-10-09 ENCOUNTER — OFFICE VISIT (OUTPATIENT)
Age: 59
End: 2024-10-09
Payer: COMMERCIAL

## 2024-10-09 VITALS
SYSTOLIC BLOOD PRESSURE: 140 MMHG | WEIGHT: 207 LBS | BODY MASS INDEX: 27.43 KG/M2 | HEIGHT: 73 IN | RESPIRATION RATE: 14 BRPM | HEART RATE: 105 BPM | DIASTOLIC BLOOD PRESSURE: 80 MMHG | TEMPERATURE: 98 F | OXYGEN SATURATION: 97 %

## 2024-10-09 DIAGNOSIS — F51.01 PRIMARY INSOMNIA: Primary | ICD-10-CM

## 2024-10-09 DIAGNOSIS — F32.0 MILD MAJOR DEPRESSION: ICD-10-CM

## 2024-10-09 PROCEDURE — 99213 OFFICE O/P EST LOW 20 MIN: CPT | Performed by: FAMILY MEDICINE

## 2024-10-09 RX ORDER — QUETIAPINE FUMARATE 25 MG/1
25 TABLET, FILM COATED ORAL NIGHTLY
Qty: 30 TABLET | Refills: 0 | Status: SHIPPED | OUTPATIENT
Start: 2024-10-09

## 2024-10-09 NOTE — PROGRESS NOTES
"Chief Complaint  Insomnia and Depression    Subjective        Ochoa Mojica presents to [facility] PRIMARY [facility]ARE  History of Present Illness  The patient is here with complaints if depression and insomnia. It has worsening over the last few weeks.   Depression  His past medical history is significant for depression.   The patient states his energy level is on the lower level but not bad.  He describes his concentration as not bad.  Functionally he is doing well and his activities of daily living.  The patient states he has ups and downs but he does not feel down.  When I asked the patient whether he feels depressed he states that he does not feel like he is happy.  The patient's thinks that he still has interest in general activities but it may be diminished somewhat.  The patient states that he is currently not under any major life stress however in the remote past he did have a retinal detachment which he had to deal with for few months which caused a lot of anxiety.  He is also going to be changing his insurance and will be going to an [employer] sometime beginning in January the patient does not have a history of depression in the remote past.  He has been treated for depression in the past and currently is not using alcohol or any recreational drugs.  And the patient states he does not have any memory loss    .    Objective   Vital Signs:  /80 (BP Location: Left arm, Patient Position: Sitting, Cuff Size: Adult)   Pulse 105   Temp 98 °F (36.7 °C) (Temporal)   Resp 14   Ht 185.4 cm (72.99\")   Wt 93.9 kg (207 lb)   SpO2 97%   BMI 27.32 kg/m²   Estimated body mass index is 27.32 kg/m² as calculated from the following:    Height as of this encounter: 185.4 cm (72.99\").    Weight as of this encounter: 93.9 kg (207 lb).            Physical Exam  Constitutional:       General: He is not in acute distress.     Appearance: Normal appearance. He is not ill-appearing, toxic-appearing or " diaphoretic.   HENT:      Head: Normocephalic and atraumatic.      Right Ear: There is no impacted cerumen.      Left Ear: There is no impacted cerumen.      Nose: No congestion or rhinorrhea.      Mouth/Throat:      Pharynx: Oropharynx is clear. No oropharyngeal exudate or posterior oropharyngeal erythema.   Eyes:      General: No scleral icterus.        Right eye: No discharge.         Left eye: No discharge.      Extraocular Movements: Extraocular movements intact.      Conjunctiva/sclera: Conjunctivae normal.      Pupils: Pupils are equal, round, and reactive to light.   Cardiovascular:      Rate and Rhythm: Normal rate and regular rhythm.      Pulses: Normal pulses.      Heart sounds: Normal heart sounds.   Pulmonary:      Effort: Pulmonary effort is normal.      Breath sounds: Normal breath sounds.   Abdominal:      General: Abdomen is flat. Bowel sounds are normal.      Palpations: Abdomen is soft.   Musculoskeletal:         General: Normal range of motion.      Cervical back: Normal range of motion and neck supple.   Skin:     General: Skin is warm.   Neurological:      General: No focal deficit present.      Mental Status: He is alert and oriented to person, place, and time. Mental status is at baseline.   Psychiatric:         Attention and Perception: Attention and perception normal.         Mood and Affect: Mood is anxious.         Speech: Speech normal.         Behavior: Behavior normal. Behavior is cooperative.         Thought Content: Thought content normal.         Cognition and Memory: Cognition and memory normal.         Judgment: Judgment normal.        Result Review :           Assessment and Plan   Diagnoses and all orders for this visit:    1. Primary insomnia (Primary)  -     QUEtiapine (SEROquel) 25 MG tablet; Take 1 tablet by mouth Every Night.  Dispense: 30 tablet; Refill: 0    2. Mild major depression           Follow Up   No follow-ups on file.  Patient was given instructions and  counseling regarding his condition or for health maintenance advice. Please see specific information pulled into the AVS if appropriate.

## 2024-10-16 ENCOUNTER — OFFICE VISIT (OUTPATIENT)
Age: 59
End: 2024-10-16
Payer: COMMERCIAL

## 2024-10-16 VITALS
HEIGHT: 73 IN | HEART RATE: 77 BPM | DIASTOLIC BLOOD PRESSURE: 70 MMHG | WEIGHT: 207 LBS | BODY MASS INDEX: 27.43 KG/M2 | SYSTOLIC BLOOD PRESSURE: 130 MMHG | TEMPERATURE: 97.2 F | RESPIRATION RATE: 14 BRPM | OXYGEN SATURATION: 97 %

## 2024-10-16 DIAGNOSIS — F51.01 PRIMARY INSOMNIA: ICD-10-CM

## 2024-10-16 DIAGNOSIS — F33.41 RECURRENT MAJOR DEPRESSIVE DISORDER, IN PARTIAL REMISSION: Primary | ICD-10-CM

## 2024-10-16 PROCEDURE — 99214 OFFICE O/P EST MOD 30 MIN: CPT | Performed by: FAMILY MEDICINE

## 2024-10-16 RX ORDER — BUPROPION HYDROCHLORIDE 150 MG/1
150 TABLET ORAL DAILY
Qty: 30 TABLET | Refills: 3 | Status: SHIPPED | OUTPATIENT
Start: 2024-10-16

## 2024-10-16 RX ORDER — QUETIAPINE FUMARATE 25 MG/1
25 TABLET, FILM COATED ORAL NIGHTLY
Qty: 30 TABLET | Refills: 0 | Status: SHIPPED | OUTPATIENT
Start: 2024-10-16

## 2024-10-16 NOTE — PROGRESS NOTES
"Chief Complaint  Depression (Follow up) and Insomnia (Follow up)    Subjective        Ochoa Mojica presents to [facility]  History of Present Illness  The patient is here to follow up on his depression and insomnia. The medication prescribed last week helped.     The patient comes in follow-up for his insomnia he is currently taking Seroquel 25 mg nightly and he reports that it has been helpful for him.  Additionally he is experiencing problems with recurrent depression in the remote past he has been taking ES citalopram 10 mg and it was titrated all the way up to 20 mg and he thinks that was helpful he does have an up a appointment with [facility] in the upcoming days.  He has seen them before in the past and they also had him taking the same medication as noted above.  .    Objective   Vital Signs:  /70 (BP Location: Left arm, Patient Position: Sitting, Cuff Size: Adult)   Pulse 77   Temp 97.2 °F (36.2 °C) (Temporal)   Resp 14   Ht 185.4 cm (72.99\")   Wt 93.9 kg (207 lb)   SpO2 97%   BMI 27.32 kg/m²   Estimated body mass index is 27.32 kg/m² as calculated from the following:    Height as of this encounter: 185.4 cm (72.99\").    Weight as of this encounter: 93.9 kg (207 lb).            Physical Exam  Constitutional:       General: He is not in acute distress.     Appearance: Normal appearance. He is not ill-appearing, toxic-appearing or diaphoretic.   HENT:      Head: Normocephalic and atraumatic.      Right Ear: There is no impacted cerumen.      Left Ear: There is no impacted cerumen.      Nose: No congestion or rhinorrhea.      Mouth/Throat:      Pharynx: Oropharynx is clear. No oropharyngeal exudate or posterior oropharyngeal erythema.   Eyes:      General: No scleral icterus.        Right eye: No discharge.         Left eye: No discharge.      Extraocular Movements: Extraocular movements intact.      Conjunctiva/sclera: Conjunctivae normal.      " Pupils: Pupils are equal, round, and reactive to light.   Cardiovascular:      Rate and Rhythm: Normal rate and regular rhythm.      Pulses: Normal pulses.      Heart sounds: Normal heart sounds.   Pulmonary:      Effort: Pulmonary effort is normal.   Abdominal:      General: Abdomen is flat. Bowel sounds are normal.      Palpations: Abdomen is soft.   Musculoskeletal:         General: Normal range of motion.      Cervical back: Normal range of motion and neck supple.   Skin:     General: Skin is warm.   Neurological:      General: No focal deficit present.      Mental Status: He is alert and oriented to person, place, and time. Mental status is at baseline.   Psychiatric:         Attention and Perception: Attention and perception normal.         Mood and Affect: Mood is anxious.         Speech: Speech normal.         Behavior: Behavior normal. Behavior is cooperative.         Thought Content: Thought content normal.         Cognition and Memory: Cognition and memory normal.         Judgment: Judgment normal.        Result Review :           Assessment and Plan   Diagnoses and all orders for this visit:    1. Recurrent major depressive disorder, in partial remission (Primary)  -     buPROPion XL (Wellbutrin XL) 150 MG 24 hr tablet; Take 1 tablet by mouth Daily.  Dispense: 30 tablet; Refill: 3    2. Primary insomnia  -     QUEtiapine (SEROquel) 25 MG tablet; Take 1 tablet by mouth Every Night.  Dispense: 30 tablet; Refill: 0      I am going to start the medication Wellbutrin  mg 1 daily.  I think the dopaminergic effects from this medication will increase the patient's mood and also give the patient more energy during the day.  I do think the patient needs some counseling and he is going to be seeing [facility] for the same and return visit in 1 month.       Follow Up   Return in about 4 weeks (around 11/13/2024).  Patient was given instructions and counseling regarding his condition or for health  maintenance advice. Please see specific information pulled into the AVS if appropriate.

## 2024-10-18 ENCOUNTER — TELEPHONE (OUTPATIENT)
Age: 59
End: 2024-10-18
Payer: COMMERCIAL

## 2024-10-18 NOTE — TELEPHONE ENCOUNTER
Pt states he was prescribed a medication to help him sleep about a week or so ago. Pt states he's been taking this for a week now and its not helping and wears off quickly. Pt wanted to report this so we can change it.

## 2024-11-08 DIAGNOSIS — F33.41 RECURRENT MAJOR DEPRESSIVE DISORDER, IN PARTIAL REMISSION: ICD-10-CM

## 2024-11-08 RX ORDER — BUPROPION HYDROCHLORIDE 150 MG/1
150 TABLET ORAL DAILY
Qty: 90 TABLET | Refills: 3 | Status: SHIPPED | OUTPATIENT
Start: 2024-11-08

## 2025-02-20 NOTE — ED PROVIDER NOTES
Orders:    CBC; Future    Lipid Panel; Future    Comprehensive Metabolic Panel; Future    TSH with reflex to Free T4 if abnormal; Future    Urinalysis with Reflex Culture and Microscopic; Future    Hemoglobin A1c; Future      EMERGENCY DEPARTMENT ENCOUNTER    CHIEF COMPLAINT  Chief Complaint: anxiety attack  History given by: patient and wife  History limited by: none  Room Number: 09/09  PMD: Luis Armando Bolanos MD      HPI:  Pt is a 54 y.o. male who presents complaining of an anxiety attack occurring early this morning due to multiple medical problems causing him worry and inability to relax for the past few months. Today, pt reports feeling his heart was racing and was unable to catch his breath. Per wife, pt has also had issues sleeping at night. Pt denies nausea, chest pain, vomiting and fever. Pt confirms depressed feelings as well but denies SI. Pt denies hx of anxiety attack. Pt reports hx of detached retina requiring a gas bubble in July of this year, along with back surgery, which causes the pt worry and led to his worsening anxiety sx.       Duration:  A few months  Onset: gradual  Timing: intermittent  Location: psych  Quality: anxiety  Intensity/Severity: moderate  Progression: worsening  Associated Symptoms: SOA, heart racing  Previous Episodes: none      PAST MEDICAL HISTORY  Active Ambulatory Problems     Diagnosis Date Noted   • Septic bursitis of elbow 01/12/2017     Resolved Ambulatory Problems     Diagnosis Date Noted   • No Resolved Ambulatory Problems     Past Medical History:   Diagnosis Date   • Bursitis    • Hyperlipidemia    • Hypertension    • Strep throat 11/2016       PAST SURGICAL HISTORY  Past Surgical History:   Procedure Laterality Date   • BACK SURGERY  2014    herniated disc    • BACK SURGERY      6/1999 5/2012   • INCISION AND DRAINAGE ARM Right 1/12/2017    Procedure: INCISION / DEBRIDEMENT BONE ELBOW;  Surgeon: Jovany Cavazos MD;  Location: [facility];  Service:    • RETINAL DETACHMENT SURGERY      9/2019   • TONSILLECTOMY      CHILDHOOD       FAMILY HISTORY  History reviewed. No pertinent family history.    SOCIAL HISTORY  Social History     Socioeconomic History   • Marital status:       Spouse name: Not on file   • Number of children: Not on file   • Years of education: Not on file   • Highest education level: Not on file   Occupational History   • Occupation:    Tobacco Use   • Smoking status: Never Smoker   • Smokeless tobacco: Never Used   Substance and Sexual Activity   • Alcohol use: Yes     Comment: RARELY   • Drug use: No   • Sexual activity: Defer       ALLERGIES  Patient has no known allergies.    REVIEW OF SYSTEMS  Review of Systems   Constitutional: Negative for activity change, appetite change, diaphoresis and fever.   HENT: Negative for congestion and sore throat.    Eyes: Negative.    Respiratory: Positive for shortness of breath. Negative for cough.    Cardiovascular: Positive for palpitations. Negative for chest pain and leg swelling.   Gastrointestinal: Negative for abdominal pain, diarrhea, nausea and vomiting.   Endocrine: Negative.    Genitourinary: Negative for decreased urine volume and dysuria.   Musculoskeletal: Negative for neck pain.   Skin: Negative for rash and wound.   Allergic/Immunologic: Negative.    Neurological: Negative for weakness, numbness and headaches.   Hematological: Negative.    Psychiatric/Behavioral: Positive for sleep disturbance. Negative for suicidal ideas. The patient is nervous/anxious.    All other systems reviewed and are negative.      PHYSICAL EXAM  ED Triage Vitals   Temp Heart Rate Resp BP SpO2   10/15/19 0156 10/15/19 0156 10/15/19 0156 10/15/19 0158 10/15/19 0156   96.5 °F (35.8 °C) (!) 124 17 129/91 98 %      Temp src Heart Rate Source Patient Position BP Location FiO2 (%)   -- -- 10/15/19 0158 10/15/19 0158 --     Standing Left arm        Physical Exam   Constitutional: He is oriented to person, place, and time. No distress.   HENT:   Head: Normocephalic and atraumatic.   Eyes: EOM are normal. Pupils are equal, round, and reactive to light.   Left pupil 2mm right pupil 4mm   Neck: Normal range of motion. Neck supple.    Cardiovascular: Normal rate, regular rhythm and normal heart sounds.   Pulmonary/Chest: Effort normal and breath sounds normal. No respiratory distress.   Abdominal: Soft. There is no tenderness. There is no rebound and no guarding.   Musculoskeletal: Normal range of motion. He exhibits no edema.   Neurological: He is alert and oriented to person, place, and time. He has normal sensation and normal strength.   Skin: Skin is warm and dry.   Psychiatric: Affect normal. His mood appears anxious. He exhibits a depressed mood. He expresses no suicidal ideation.   Calm and cooperative   Nursing note and vitals reviewed.      LAB RESULTS  Lab Results (last 24 hours)     Procedure Component Value Units Date/Time    Ethanol [[id number]] Collected:  10/15/19 0802    Specimen:  Blood from Arm, Right Updated:  10/15/19 0830     Ethanol <10 mg/dL      Ethanol % <0.010 %     Urine Drug Screen - Urine, Clean Catch [[id number]]  (Normal) Collected:  10/15/19 0803    Specimen:  Urine, Clean Catch Updated:  10/15/19 0832     Amphet/Methamphet, Screen Negative     Barbiturates Screen, Urine Negative     Benzodiazepine Screen, Urine Negative     Cocaine Screen, Urine Negative     Opiate Screen Negative     THC, Screen, Urine Negative     Methadone Screen, Urine Negative     Oxycodone Screen, Urine Negative    Narrative:       Negative Thresholds For Drugs Screened:     Amphetamines               500 ng/ml   Barbiturates               200 ng/ml   Benzodiazepines            100 ng/ml   Cocaine                    300 ng/ml   Methadone                  300 ng/ml   Opiates                    300 ng/ml   Oxycodone                  100 ng/ml   THC                        50 ng/ml    The Normal Value for all drugs tested is negative. This report includes final unconfirmed screening results to be used for medical treatment purposes only. Unconfirmed results must not be used for non-medical purposes such as employment or legal testing. Clinical  consideration should be applied to any drug of abuse test, particulary when unconfirmed results are used.          I ordered the above labs and reviewed the results      PROCEDURES  Procedures        PROGRESS AND CONSULTS  ED Course as of Oct 15 1354   Tue Oct 15, 2019   0810 Patient now reports that he saw Dr. Bolanos, his PCP last week, and was given a prescription for medication to help with his anxiety.  He does not recall what this medication is and he states he is only taking it one time, which was yesterday.  He also does not recall if it is something he is supposed to take regularly or on an as-needed basis.  []   9162 [facility] query complete. Treatment plan to include limited course of prescribed  controlled substance. Risks including addiction, benefits, and alternatives presented to patient.     []      ED Course User Index  [] Justus Soares MD     0760 ordered labs for further evaluation. Placed call to access for pscych consult.       MEDICAL DECISION MAKING  Results were reviewed/discussed with the patient and they were also made aware of online access. Pt also made aware that some labs, such as cultures, will not be resulted during ER visit and follow up with PMD is necessary.     MDM  Number of Diagnoses or Management Options     Amount and/or Complexity of Data Reviewed  Clinical lab tests: ordered and reviewed (EtOH negative)  Decide to obtain previous medical records or to obtain history from someone other than the patient: yes  Review and summarize past medical records: yes  Discuss the patient with other providers: yes (Access provider)           DIAGNOSIS  Final diagnoses:   Anxiety   Trouble in sleeping       DISPOSITION  DISCHARGE    Patient discharged in stable condition.    Reviewed implications of results, diagnosis, meds, responsibility to follow up, warning signs and symptoms of possible worsening, potential complications and reasons to return to ER, including new or worsening  sx.    Patient/Family voiced understanding of above instructions.    Discussed plan for discharge, as there is no emergent indication for admission. Patient referred to primary care provider for BP management due to today's BP. Pt/family is agreeable and understands need for follow up and repeat testing.  Pt is aware that discharge does not mean that nothing is wrong but it indicates no emergency is present that requires admission and they must continue care with follow-up as given below or physician of their choice.     FOLLOW-UP  Luis Armando Bolanos MD  [address]  [place]  [facility] [address]  [phone]    Schedule an appointment as soon as possible for a visit           Medication List      New Prescriptions    clonazePAM 1 MG tablet  Commonly known as:  KlonoPIN  Take 1 tablet by mouth At Night As Needed for Anxiety (trouble sleeping).              Latest Documented Vital Signs:  As of 1:54 PM  BP- 125/94 HR- 82 Temp- 96.5 °F (35.8 °C) O2 sat- 98%    --  Documentation assistance provided by sarah Rogers for Dr. Soares.  Information recorded by the scribe was done at my direction and has been verified and validated by me.                 Roselyn Rogers  10/15/19 6515       Justus Soares MD  10/15/19 6925

## 2025-03-03 RX ORDER — ATORVASTATIN CALCIUM 20 MG/1
TABLET, FILM COATED ORAL DAILY
Qty: 90 TABLET | Refills: 2 | Status: SHIPPED | OUTPATIENT
Start: 2025-03-03

## 2025-03-26 NOTE — PROGRESS NOTES
"Subjective   History of Present Illness: Ochoa Mojica is a 59 y.o. male is being seen for consultation today at the request of Ray Sheikh DO for evaluation of MRI evidence of cervical stenosis.  Patient is a patient of Dr. Sheikh and has been for some time evaluating for tremor and the possibility of Parkinson's disease.  He has been followed by Dr. Sheikh since 2017 for tremor.  He did have an episode of right arm pain in 2017 that resolved spontaneously but he does not currently nor is he had in the recent past any pain weakness or numbness of the arms.  He does have some numbness of both feet attributable to neuropathy.  He denies any specific balance or coordination problems.  He denies any neck pain.  Dr. Sheikh updated some radiographic workup and asked us to comment on his cervical MRI.  The patient reminds me I did lumbar discectomies on him over 20 years ago he thought it was in the 90s but I started practice and 99 and the op reports are in our chart labeled as 2001 L5-S1 microdiscectomy and 2004 L4-5 microdiscectomy.    History of Present Illness    Tobacco Use: Low Risk  (10/16/2024)    Patient History     Smoking Tobacco Use: Never     Smokeless Tobacco Use: Never     Passive Exposure: Not on file        The following portions of the patient's history were reviewed and updated as appropriate: allergies, current medications, past family history, past medical history, past social history, past surgical history and problem list.    Review of Systems   Constitutional:  Negative for fever.   Musculoskeletal:  Positive for neck stiffness. Negative for neck pain.   Neurological:  Negative for dizziness, weakness, light-headedness, numbness and headaches.   All other systems reviewed and are negative.      Objective     Vitals:    04/03/25 0919   BP: 122/78   BP Location: Left arm   Patient Position: Sitting   Cuff Size: Adult   Resp: 20   Weight: 93.9 kg (207 lb)   Height: 185.4 cm (72.99\")   PainSc: 0-No pain "     Body mass index is 27.32 kg/m².      Physical Exam  Neurological Exam    Physical Exam:    CONSTITUTIONAL: This 59 year old  male appears well developed, well-nourished and in no acute distress.    HEAD & FACE: the head and face are symmetric, normocephalic and atraumatic.    EYES: Inspection of the conjunctivae and lids reveals no swelling, erythema or discharge.  Pupils are round, equal and reactive to light and there is no scleral icterus.    EARS, NOSE, MOUTH & THROAT: On inspection, the ears and nose are within normal limits.    NECK: the neck is supple and symmetric. The trachea is midline with no masses.      PULMONARY: Respiratory effort is normal with no increased work of breathing or signs of respiratory distress.    CARDIOVASCULAR:  Examination of the extremities shows no edema or varicosities.    MUSCULOSKELETAL: Gait and station are are slow. The spine has no tenderness in the midline.    SKIN: The skin is warm, dry and intact    NEUROLOGIC:   Cranial Nerves 2-12 intact  Normal motor strength noted to confrontational testing in the upper and lower extremities. Muscle bulk and tone are normal.  Sensory exam is normal to fine touch to confrontational testing bilaterally in both upper extremities  Reflexes on the right side demonstrates 3/4 Triceps Reflex, 3/4 Biceps Reflex, 2/4 Brachioradialis Reflex, 3/4 Knee Jerk Reflex, 2/4 Ankle Jerk Reflex and no ankle clonus on the right.   Reflexes on the left side demonstrates 3/4 Triceps Reflex, 3/4 Biceps Reflex, 2/4 Brachioradialis Reflex, 3/4 Knee Jerk Reflex, 2/4 Ankle Jerk Reflex and no ankle clonus on the left.  Superficial/Primitive Reflexes: primitive reflexes were absent.  Joseph's, Babinski, and Clonus signs all negative.  No coordination deficit observed.  Radicular testing showed a negative Spurling's maneuver  Cortical function is intact and without deficits. Speech is normal.    PSYCHIATRIC: oriented to person, place and time.  Patient's mood and affect reflect a fairly flat affect      Assessment & Plan   Independent Review of Radiographic Studies:      I personally reviewed the images from the following studies.    MRI of the cervical spine good February 12, 2025 reveals progression of degenerative changes at C5-C6 and C6-7 compared to a 2021 cervical MRI.  Canal narrowing is considered moderate at C5-C6 and mild at C6-C7.  At C5-C6 foraminal narrowing is considered severe bilaterally.  Foraminal narrowing is worse on the left than the right at C4-C5 and C6-C7.            Medical Decision Making:      Patient has moderate spinal stenosis worse at C4-C5 followed by less significant stenosis at the other levels.  The official report from the radiologist suggests that the degree of degenerative change has become more pronounced at C5-C6 and C6-C7 than it was on a prior study.  Regardless of the change he does not have cord compression or signal change in the cord.  He does exhibit some hyperreflexia but no pathologic reflexes and no other symptomology to suggest cervical myelopathy.  Therefore, from the neurosurgical perspective I would not recommend a prophylactic cervical surgery as the risk of surgery does not outweigh the benefit in this scenario.  I educated him what to look out for should he develop radicular symptoms in the upper extremities or if he develops myelopathic complaints such as discoordination of the hands, weakness of the hands or numbness/dysesthesias of the hands.  He is going to maintain follow-up with Dr. Sheikh for his tremor and Parkinson's evaluation.    Return for any new or recurrent neurosurgical problems.    Diagnoses and all orders for this visit:    1. Spinal stenosis in cervical region (Primary)             Anderson Garza MD FACS [facility]  Neurological Surgery

## 2025-04-03 ENCOUNTER — PATIENT ROUNDING (BHMG ONLY) (OUTPATIENT)
Dept: NEUROSURGERY | Facility: CLINIC | Age: 60
End: 2025-04-03
Payer: COMMERCIAL

## 2025-04-03 ENCOUNTER — OFFICE VISIT (OUTPATIENT)
Dept: NEUROSURGERY | Facility: CLINIC | Age: 60
End: 2025-04-03
Payer: COMMERCIAL

## 2025-04-03 VITALS
WEIGHT: 207 LBS | BODY MASS INDEX: 27.43 KG/M2 | SYSTOLIC BLOOD PRESSURE: 122 MMHG | HEIGHT: 73 IN | DIASTOLIC BLOOD PRESSURE: 78 MMHG | RESPIRATION RATE: 20 BRPM

## 2025-04-03 DIAGNOSIS — M48.02 SPINAL STENOSIS IN CERVICAL REGION: Primary | ICD-10-CM

## 2025-04-03 PROCEDURE — 99204 OFFICE O/P NEW MOD 45 MIN: CPT | Performed by: NEUROLOGICAL SURGERY

## 2025-04-03 RX ORDER — GABAPENTIN 100 MG/1
100 CAPSULE ORAL 3 TIMES DAILY
COMMUNITY
Start: 2025-03-07

## 2025-04-03 RX ORDER — ESCITALOPRAM OXALATE 20 MG/1
20 TABLET ORAL DAILY
COMMUNITY
Start: 2025-01-01

## 2025-04-13 ENCOUNTER — APPOINTMENT (OUTPATIENT)
Dept: GENERAL RADIOLOGY | Facility: HOSPITAL | Age: 60
End: 2025-04-13
Payer: COMMERCIAL

## 2025-04-13 ENCOUNTER — APPOINTMENT (OUTPATIENT)
Dept: CT IMAGING | Facility: HOSPITAL | Age: 60
End: 2025-04-13
Payer: COMMERCIAL

## 2025-04-13 ENCOUNTER — HOSPITAL ENCOUNTER (EMERGENCY)
Facility: HOSPITAL | Age: 60
Discharge: HOME | End: 2025-04-13
Attending: EMERGENCY MEDICINE | Admitting: EMERGENCY MEDICINE
Payer: COMMERCIAL

## 2025-04-13 VITALS
TEMPERATURE: 97 F | HEART RATE: 78 BPM | SYSTOLIC BLOOD PRESSURE: 166 MMHG | RESPIRATION RATE: 20 BRPM | DIASTOLIC BLOOD PRESSURE: 83 MMHG | OXYGEN SATURATION: 97 %

## 2025-04-13 DIAGNOSIS — S09.90XA CLOSED HEAD INJURY, INITIAL ENCOUNTER: ICD-10-CM

## 2025-04-13 DIAGNOSIS — R93.0 ABNORMAL CT OF THE HEAD: ICD-10-CM

## 2025-04-13 DIAGNOSIS — R06.00 DYSPNEA, UNSPECIFIED TYPE: ICD-10-CM

## 2025-04-13 DIAGNOSIS — R55 SYNCOPE, UNSPECIFIED SYNCOPE TYPE: Primary | ICD-10-CM

## 2025-04-13 DIAGNOSIS — E83.39 HYPOPHOSPHATEMIA: ICD-10-CM

## 2025-04-13 DIAGNOSIS — R73.9 HYPERGLYCEMIA: ICD-10-CM

## 2025-04-13 LAB
ALBUMIN SERPL-MCNC: 4.5 G/DL (ref 3.5–5.2)
ALBUMIN/GLOB SERPL: 1.5 G/DL
ALP SERPL-CCNC: 101 U/L (ref 39–117)
ALT SERPL W P-5'-P-CCNC: 34 U/L (ref 1–41)
ANION GAP SERPL CALCULATED.3IONS-SCNC: 14 MMOL/L (ref 5–15)
AST SERPL-CCNC: 37 U/L (ref 1–40)
BASOPHILS # BLD AUTO: 0.03 10*3/MM3 (ref 0–0.2)
BASOPHILS NFR BLD AUTO: 0.4 % (ref 0–1.5)
BILIRUB SERPL-MCNC: 0.7 MG/DL (ref 0–1.2)
BUN SERPL-MCNC: 18 MG/DL (ref 8–23)
BUN/CREAT SERPL: 19.4 (ref 7–25)
CALCIUM SPEC-SCNC: 9.5 MG/DL (ref 8.6–10.5)
CHLORIDE SERPL-SCNC: 104 MMOL/L (ref 98–107)
CO2 SERPL-SCNC: 20 MMOL/L (ref 22–29)
CREAT SERPL-MCNC: 0.93 MG/DL (ref 0.76–1.27)
D DIMER PPP FEU-MCNC: 0.53 MCGFEU/ML (ref 0–0.61)
D-LACTATE SERPL-SCNC: 1.3 MMOL/L (ref 0.5–2)
D-LACTATE SERPL-SCNC: 3.1 MMOL/L (ref 0.5–2)
DEPRECATED RDW RBC AUTO: 38.9 FL (ref 37–54)
EGFRCR SERPLBLD CKD-EPI 2021: 93.4 ML/MIN/1.73
EOSINOPHIL # BLD AUTO: 0.03 10*3/MM3 (ref 0–0.4)
EOSINOPHIL NFR BLD AUTO: 0.4 % (ref 0.3–6.2)
ERYTHROCYTE [DISTWIDTH] IN BLOOD BY AUTOMATED COUNT: 11.7 % (ref 12.3–15.4)
GEN 5 1HR TROPONIN T REFLEX: 9 NG/L
GLOBULIN UR ELPH-MCNC: 3.1 GM/DL
GLUCOSE SERPL-MCNC: 117 MG/DL (ref 65–99)
HCT VFR BLD AUTO: 47.3 % (ref 37.5–51)
HGB BLD-MCNC: 16.3 G/DL (ref 13–17.7)
IMM GRANULOCYTES # BLD AUTO: 0.01 10*3/MM3 (ref 0–0.05)
IMM GRANULOCYTES NFR BLD AUTO: 0.1 % (ref 0–0.5)
INR PPP: 1.1 (ref 0.9–1.1)
LYMPHOCYTES # BLD AUTO: 1.29 10*3/MM3 (ref 0.7–3.1)
LYMPHOCYTES NFR BLD AUTO: 19 % (ref 19.6–45.3)
MAGNESIUM SERPL-MCNC: 1.9 MG/DL (ref 1.6–2.4)
MCH RBC QN AUTO: 31.5 PG (ref 26.6–33)
MCHC RBC AUTO-ENTMCNC: 34.5 G/DL (ref 31.5–35.7)
MCV RBC AUTO: 91.3 FL (ref 79–97)
MONOCYTES # BLD AUTO: 0.67 10*3/MM3 (ref 0.1–0.9)
MONOCYTES NFR BLD AUTO: 9.9 % (ref 5–12)
NEUTROPHILS NFR BLD AUTO: 4.77 10*3/MM3 (ref 1.7–7)
NEUTROPHILS NFR BLD AUTO: 70.2 % (ref 42.7–76)
NRBC BLD AUTO-RTO: 0 /100 WBC (ref 0–0.2)
NT-PROBNP SERPL-MCNC: 76.7 PG/ML (ref 0–900)
PHOSPHATE SERPL-MCNC: 1.4 MG/DL (ref 2.5–4.5)
PLATELET # BLD AUTO: 253 10*3/MM3 (ref 140–450)
PMV BLD AUTO: 10.5 FL (ref 6–12)
POTASSIUM SERPL-SCNC: 4 MMOL/L (ref 3.5–5.2)
PROCALCITONIN SERPL-MCNC: <0.02 NG/ML (ref 0–0.25)
PROT SERPL-MCNC: 7.6 G/DL (ref 6–8.5)
PROTHROMBIN TIME: 14.1 SECONDS (ref 11.7–14.2)
RBC # BLD AUTO: 5.18 10*6/MM3 (ref 4.14–5.8)
SODIUM SERPL-SCNC: 138 MMOL/L (ref 136–145)
TROPONIN T NUMERIC DELTA: -2 NG/L
TROPONIN T SERPL HS-MCNC: 11 NG/L
TSH SERPL DL<=0.05 MIU/L-ACNC: 1.75 UIU/ML (ref 0.27–4.2)
WBC NRBC COR # BLD AUTO: 6.8 10*3/MM3 (ref 3.4–10.8)

## 2025-04-13 PROCEDURE — 83605 ASSAY OF LACTIC ACID: CPT | Performed by: EMERGENCY MEDICINE

## 2025-04-13 PROCEDURE — 93005 ELECTROCARDIOGRAM TRACING: CPT | Performed by: EMERGENCY MEDICINE

## 2025-04-13 PROCEDURE — 83880 ASSAY OF NATRIURETIC PEPTIDE: CPT | Performed by: EMERGENCY MEDICINE

## 2025-04-13 PROCEDURE — 84100 ASSAY OF PHOSPHORUS: CPT | Performed by: EMERGENCY MEDICINE

## 2025-04-13 PROCEDURE — 84145 PROCALCITONIN (PCT): CPT | Performed by: EMERGENCY MEDICINE

## 2025-04-13 PROCEDURE — 83735 ASSAY OF MAGNESIUM: CPT | Performed by: EMERGENCY MEDICINE

## 2025-04-13 PROCEDURE — 80050 GENERAL HEALTH PANEL: CPT | Performed by: EMERGENCY MEDICINE

## 2025-04-13 PROCEDURE — 71045 X-RAY EXAM CHEST 1 VIEW: CPT

## 2025-04-13 PROCEDURE — 25010000002 TETANUS-DIPHTH-ACELL PERTUSSIS 5-2.5-18.5 LF-MCG/0.5 SUSPENSION PREFILLED SYRINGE: Performed by: EMERGENCY MEDICINE

## 2025-04-13 PROCEDURE — 84484 ASSAY OF TROPONIN QUANT: CPT | Performed by: EMERGENCY MEDICINE

## 2025-04-13 PROCEDURE — 90715 TDAP VACCINE 7 YRS/> IM: CPT | Performed by: EMERGENCY MEDICINE

## 2025-04-13 PROCEDURE — 85379 FIBRIN DEGRADATION QUANT: CPT | Performed by: EMERGENCY MEDICINE

## 2025-04-13 PROCEDURE — 25810000003 SODIUM CHLORIDE 0.9 % SOLUTION: Performed by: EMERGENCY MEDICINE

## 2025-04-13 PROCEDURE — 96360 HYDRATION IV INFUSION INIT: CPT

## 2025-04-13 PROCEDURE — 36415 COLL VENOUS BLD VENIPUNCTURE: CPT

## 2025-04-13 PROCEDURE — 70450 CT HEAD/BRAIN W/O DYE: CPT

## 2025-04-13 PROCEDURE — 99284 EMERGENCY DEPT VISIT MOD MDM: CPT

## 2025-04-13 PROCEDURE — 85610 PROTHROMBIN TIME: CPT | Performed by: EMERGENCY MEDICINE

## 2025-04-13 PROCEDURE — 90471 IMMUNIZATION ADMIN: CPT | Performed by: EMERGENCY MEDICINE

## 2025-04-13 RX ORDER — SODIUM CHLORIDE 0.9 % (FLUSH) 0.9 %
10 SYRINGE (ML) INJECTION AS NEEDED
Status: DISCONTINUED | OUTPATIENT
Start: 2025-04-13 | End: 2025-04-13 | Stop reason: HOSPADM

## 2025-04-13 RX ADMIN — SODIUM CHLORIDE 500 ML: 9 INJECTION, SOLUTION INTRAVENOUS at 15:08

## 2025-04-13 RX ADMIN — Medication 2 PACKET: at 16:01

## 2025-04-13 RX ADMIN — SODIUM CHLORIDE 1000 ML: 0.9 INJECTION, SOLUTION INTRAVENOUS at 15:54

## 2025-04-13 RX ADMIN — TETANUS TOXOID, REDUCED DIPHTHERIA TOXOID AND ACELLULAR PERTUSSIS VACCINE, ADSORBED 0.5 ML: 5; 2.5; 8; 8; 2.5 SUSPENSION INTRAMUSCULAR at 15:59

## 2025-04-13 NOTE — ED PROVIDER NOTES
EMERGENCY DEPARTMENT ENCOUNTER  Room Number:  11/11  Date of encounter:  4/13/2025  PCP: Luis Armando Bolanos MD  Patient Care Team:  Luis Armando Bolanos MD as PCP - General (Family Medicine)     HPI:  Context: Ochoa Mojica is a 59 y.o. male who presents to the ED c/o chief complaint of patient.  Patient reports he had been feeling extremely fatigued for the last month.  Patient reports he was moving some furniture yesterday, bent over to pick something up, did not attempt to stand up, did not have any prodrome, reports he just suddenly lost consciousness.  Syncope was witnessed, no seizure-like activity, no incontinence of bowel or bladder, patient was not postictal.  Patient denied any chest pain shortness of breath or palpitations associated with syncope.  Patient reports that since the event yesterday he has been feeling short of breath as well as lightheaded today.  Patient denies any of the symptoms yesterday.  No recent vomiting or diarrhea, eating and drinking normally, no runny nose congestion sore throat or cough, no fever shakes chills or night sweats.  Patient denies any history of syncope in the past, no history of cardiac disease, no history of arrhythmia.  Patient does have history of hypertension, is on chronic medication, denies any recent medication changes.  Patient reports he did injure his lip in the fall yesterday but otherwise denies any injury, no headache, no vision changes, no neck pain, no radicular pain, no weakness or numbness.  She reports tetanus is not up-to-date.    MEDICAL HISTORY REVIEW  Reviewed in EPIC    PAST MEDICAL HISTORY  Active Ambulatory Problems     Diagnosis Date Noted    Septic bursitis of elbow 01/12/2017    Major depressive disorder, recurrent episode, moderate 10/16/2019    Major depressive disorder, recurrent 10/18/2019    History of colon polyps 11/01/2023    Disc degeneration, lumbar 09/30/2019    Essential hypertension 01/05/2018    Family history of malignant  neoplasm of colon 02/25/2019    Mixed hyperlipidemia 01/15/2018    Iron deficiency 10/24/2023    Retinal detachment 10/07/2019    Prediabetes 02/25/2019    Vitamin D deficiency 01/05/2018    Steatosis of liver 02/25/2019    At risk for cardiovascular event 07/10/2023    Chronic low back pain 10/07/2019    Vitreous detachment 07/11/2019    Primary insomnia 10/16/2024    Spinal stenosis in cervical region 04/03/2025     Resolved Ambulatory Problems     Diagnosis Date Noted    No Resolved Ambulatory Problems     Past Medical History:   Diagnosis Date    Bursitis     Colon polyp     Hyperlipidemia     Hypertension     Macular disorder     Strep throat 11/2016       PAST SURGICAL HISTORY  Past Surgical History:   Procedure Laterality Date    BACK SURGERY  2014    herniated disc     BACK SURGERY      6/1999 5/2012    COLONOSCOPY  2014    multiple polyps removed    COLONOSCOPY N/A 12/8/2023    Procedure: COLONOSCOPY to Cecum;  Surgeon: Anjelica Deras MD;  Location: [facility] MAIN OR;  Service: Gastroenterology;  Laterality: N/A;  Diverticulosis    INCISION AND DRAINAGE ARM Right 01/12/2017    Procedure: INCISION / DEBRIDEMENT BONE ELBOW;  Surgeon: Jovany Cavazos MD;  Location: [facility] MAIN OR;  Service:     RETINAL DETACHMENT SURGERY      9/2019    TONSILLECTOMY      CHILDHOOD       FAMILY HISTORY  Family History   Problem Relation Age of Onset    Hypertension Mother     Alcohol abuse Mother     Hypertension Father     Colon cancer Father     Breast cancer Sister        SOCIAL HISTORY  Social History     Socioeconomic History    Marital status:    Tobacco Use    Smoking status: Never    Smokeless tobacco: Never   Vaping Use    Vaping status: Never Used   Substance and Sexual Activity    Alcohol use: Yes     Comment: RARELY    Drug use: No    Sexual activity: Yes     Partners: Female       ALLERGIES  Patient has no known allergies.    The patient's allergies have been reviewed    REVIEW OF SYSTEMS  All systems  reviewed and negative except for those discussed in HPI.     PHYSICAL EXAM  I have reviewed the triage vital signs and nursing notes.  ED Triage Vitals   Temp Heart Rate Resp BP SpO2   04/13/25 1410 04/13/25 1410 04/13/25 1410 04/13/25 1428 04/13/25 1410   97 °F (36.1 °C) (!) 127 18 133/76 94 %      Temp src Heart Rate Source Patient Position BP Location FiO2 (%)   -- -- -- -- --              General: No acute distress.  HENT: No crepitus or deformity, no raccoon eyes or Schulte sign, abrasion on left upper lip, no obvious dental injury, no facial anesthesia, visual acuity grossly normal, EOMI, PERRL, Nares patent.  Eyes: no scleral icterus.  Neck: trachea midline, no ROM limitations.  CV: regular rhythm, regular rate.  Respiratory: normal effort, CTAB.  Abdomen: soft, nondistended, NTTP, no rebound tenderness, no guarding or rigidity.  Musculoskeletal: no deformity.  Neuro: Alert and oriented x3, extraocular motion intact, pupils are equal and round reactive to light, cranial nerves II through XII are grossly intact, normal speech, moves all extremities well, 5 out of 5 strength all 4 extremities, sensation intact light touch all 4 extremities, no ataxia.  Skin: warm, dry.    LAB RESULTS  Recent Results (from the past 24 hours)   ECG 12 Lead Syncope    Collection Time: 04/13/25  2:22 PM   Result Value Ref Range    QT Interval 399 ms    QTC Interval 452 ms   Comprehensive Metabolic Panel    Collection Time: 04/13/25  2:34 PM    Specimen: Blood   Result Value Ref Range    Glucose 117 (H) 65 - 99 mg/dL    BUN 18 8 - 23 mg/dL    Creatinine 0.93 0.76 - 1.27 mg/dL    Sodium 138 136 - 145 mmol/L    Potassium 4.0 3.5 - 5.2 mmol/L    Chloride 104 98 - 107 mmol/L    CO2 20.0 (L) 22.0 - 29.0 mmol/L    Calcium 9.5 8.6 - 10.5 mg/dL    Total Protein 7.6 6.0 - 8.5 g/dL    Albumin 4.5 3.5 - 5.2 g/dL    ALT (SGPT) 34 1 - 41 U/L    AST (SGOT) 37 1 - 40 U/L    Alkaline Phosphatase 101 39 - 117 U/L    Total Bilirubin 0.7 0.0 - 1.2  mg/dL    Globulin 3.1 gm/dL    A/G Ratio 1.5 g/dL    BUN/Creatinine Ratio 19.4 7.0 - 25.0    Anion Gap 14.0 5.0 - 15.0 mmol/L    eGFR 93.4 >60.0 mL/min/1.73   Protime-INR    Collection Time: 04/13/25  2:34 PM    Specimen: Blood   Result Value Ref Range    Protime 14.1 11.7 - 14.2 Seconds    INR 1.10 0.90 - 1.10   BNP    Collection Time: 04/13/25  2:34 PM    Specimen: Blood   Result Value Ref Range    proBNP 76.7 0.0 - 900.0 pg/mL   High Sensitivity Troponin T    Collection Time: 04/13/25  2:34 PM    Specimen: Blood   Result Value Ref Range    HS Troponin T 11 <22 ng/L   Lactic Acid, Plasma    Collection Time: 04/13/25  2:34 PM    Specimen: Blood   Result Value Ref Range    Lactate 3.1 (C) 0.5 - 2.0 mmol/L   Procalcitonin    Collection Time: 04/13/25  2:34 PM    Specimen: Blood   Result Value Ref Range    Procalcitonin <0.02 0.00 - 0.25 ng/mL   Magnesium    Collection Time: 04/13/25  2:34 PM    Specimen: Blood   Result Value Ref Range    Magnesium 1.9 1.6 - 2.4 mg/dL   Phosphorus    Collection Time: 04/13/25  2:34 PM    Specimen: Blood   Result Value Ref Range    Phosphorus 1.4 (C) 2.5 - 4.5 mg/dL   CBC Auto Differential    Collection Time: 04/13/25  2:34 PM    Specimen: Blood   Result Value Ref Range    WBC 6.80 3.40 - 10.80 10*3/mm3    RBC 5.18 4.14 - 5.80 10*6/mm3    Hemoglobin 16.3 13.0 - 17.7 g/dL    Hematocrit 47.3 37.5 - 51.0 %    MCV 91.3 79.0 - 97.0 fL    MCH 31.5 26.6 - 33.0 pg    MCHC 34.5 31.5 - 35.7 g/dL    RDW 11.7 (L) 12.3 - 15.4 %    RDW-SD 38.9 37.0 - 54.0 fl    MPV 10.5 6.0 - 12.0 fL    Platelets 253 140 - 450 10*3/mm3    Neutrophil % 70.2 42.7 - 76.0 %    Lymphocyte % 19.0 (L) 19.6 - 45.3 %    Monocyte % 9.9 5.0 - 12.0 %    Eosinophil % 0.4 0.3 - 6.2 %    Basophil % 0.4 0.0 - 1.5 %    Immature Grans % 0.1 0.0 - 0.5 %    Neutrophils, Absolute 4.77 1.70 - 7.00 10*3/mm3    Lymphocytes, Absolute 1.29 0.70 - 3.10 10*3/mm3    Monocytes, Absolute 0.67 0.10 - 0.90 10*3/mm3    Eosinophils, Absolute 0.03  0.00 - 0.40 10*3/mm3    Basophils, Absolute 0.03 0.00 - 0.20 10*3/mm3    Immature Grans, Absolute 0.01 0.00 - 0.05 10*3/mm3    nRBC 0.0 0.0 - 0.2 /100 WBC   D-dimer, Quantitative    Collection Time: 04/13/25  2:34 PM    Specimen: Blood   Result Value Ref Range    D-Dimer, Quantitative 0.53 0.00 - 0.61 MCGFEU/mL   TSH Rfx On Abnormal To Free T4    Collection Time: 04/13/25  2:34 PM    Specimen: Blood   Result Value Ref Range    TSH 1.750 0.270 - 4.200 uIU/mL   High Sensitivity Troponin T 1Hr    Collection Time: 04/13/25  3:58 PM    Specimen: Blood   Result Value Ref Range    HS Troponin T 9 <22 ng/L    Troponin T Numeric Delta -2 Abnormal if >/=3 ng/L   STAT Lactic Acid, Reflex    Collection Time: 04/13/25  5:44 PM    Specimen: Blood   Result Value Ref Range    Lactate 1.3 0.5 - 2.0 mmol/L       I ordered the above labs and reviewed the results.    RADIOLOGY  CT Head Without Contrast  Result Date: 4/13/2025  CT HEAD WO CONTRAST-  INDICATION: Syncope  COMPARISON: None  TECHNIQUE: Routine CT head without IV contrast. Coronal and sagittal reformats. Radiation dose reduction techniques were utilized, including automated exposure control and exposure modulation based on body size.  FINDINGS:  Brain: No intraparenchymal hemorrhage. Chronic small vessel ischemic changes. Subtle hypoattenuation seen in the anterior limb right internal capsule and adjacent caudate head. No mass effect or midline shift.  Ventricles: No hydrocephalus. No intraventricular hemorrhage.  Extra-axial spaces: No extra-axial hemorrhage or fluid collection seen.  Orbits: Right cataract extraction.  Osseous structures: No fracture.  Sinuses: Patent mastoid air cells and middle ears. Patent paranasal sinuses.      Small area of hypoattenuation seen in the anterior limb right internal capsule and adjacent caudate head. Could be an age-indeterminate lacunar infarction or some asymmetric chronic small vessel ischemic change. Recommend comparison with any  priors if available. If none available, consider MRI follow-up to better characterize the finding.  This report was finalized on 4/13/2025 5:01 PM by Dr. Ray Campos M.D on Workstation: [id number]      XR Chest 1 View  Result Date: 4/13/2025   XR CHEST 1 VW-  HISTORY: Syncope  COMPARISON: AP chest 02/24/2017  FINDINGS: Heart size within normal limits. Cardiomediastinal silhouette within normal limits. Mild elevation of right hemidiaphragm. Lungs appear clear of focal airspace disease and there is no evidence for pulmonary or pleural effusion or infiltrate. Cardiac monitoring leads are present.      No evidence for active disease in the chest.   This report was finalized on 4/13/2025 4:44 PM by Mukul Diaz M.D on Workstation: [id number]        I ordered the above noted radiological studies. I reviewed the images and results. I agree with the radiologist interpretation.    PROCEDURES  Procedures    MEDICATIONS GIVEN IN ER  Medications   sodium chloride 0.9 % flush 10 mL (has no administration in time range)   Phosphorus Replacement - Follow Nurse / BPA Driven Protocol (has no administration in time range)   sodium chloride 0.9 % bolus 500 mL (0 mL Intravenous Stopped 4/13/25 1553)   potassium & sodium phosphates (PHOS-NAK) 280-160-250 MG packet 2 packet (2 packets Oral Given 4/13/25 1601)   Tetanus-Diphth-Acell Pertussis (BOOSTRIX) injection 0.5 mL (0.5 mL Intramuscular Given 4/13/25 1559)   sodium chloride 0.9 % bolus 1,000 mL (0 mL Intravenous Stopped 4/13/25 1743)       PROGRESS, DATA ANALYSIS, CONSULTS, AND MEDICAL DECISION MAKING  A complete history and physical exam have been performed.  All available laboratory and imaging results have been reviewed by myself prior to disposition.    MDM    After the initial H&P, I discussed pertinent information from history and physical exam with patient/family.  Discussed differential diagnosis.  Discussed plan for ED evaluation/workup/treatment.  All  questions answered.  Patient/family is agreeable with plan.  ED Course as of 04/13/25 1816   Sun Apr 13, 2025   1501 My differential diagnosis for syncope includes but is not limited to:  Vasovagal reflex - situational stimulus, micturition, defecation, cough, sneezing, swallowing, postprandial state, react sinus hypersensitivity  Vascular-prolonged recumbency, sudden postural change, prolonged standing, hypovolemia, vasodilator drugs, autonomic neuropathy, adrenal insufficiency, subclavian steal, pulmonary embolism  Cardiac -arrhythmia, heart block, myocardial infarction, aortic stenosis, cardiac myxoma, cardiac, LV Dysfunction, Aortic Dissection, Pulmonary Hypertension, Pulmonary Stenosis, Pacemaker Failure  CNS-seizure, hypoxia, hypoglycemia, TIA,(basal vertebral), hydrocephalus     [JG]   1501 EKG independently viewed and contemporaneously interpreted by ED physician. Time: 1422.  Rate 77.  Interpretation: Normal sinus rhythm, normal axis, early R wave transition, no acute ST changes. [JG]   1501 I reviewed chest x-ray in PACS, no pulmonary infiltrates per my read. [JG]   1503 Review of prior external notes (non-ED) -and- Review of prior external test results outside of this encounter:   I reviewed patient's primary care office visit note from October last year, patient followed up for insomnia, experiencing recurrent depression.  I reviewed patient's BMP from 9/20/2024, creatinine normal, BMP unremarkable other than glucose 112. [JG]   1645 Patient orthostatic positive, receiving IV fluids. [JG]   1656 I reviewed CT head imaging in PACS, no intracranial hemorrhage per my read. [JG]   1719 Rest, dimer negative, PE ruled out. [JG]   1719 EKG showed no acute findings, troponin negative, patient placed on cardiac monitor for complaint of syncope, my interpretation is normal sinus rhythm.  Patient is not anemic, not dehydrated, ED workup unremarkable other than  phosphorus 1.4, glucose 117, lactic acid 3.1.  Patient  receiving IV fluids with plans for repeat lactic acid, replacing phosphorus per protocol. [JG]   1813 Reassessed, discussed ED workup and results, discussed plans for admission for further evaluation and treatment.  Patient reports he does not wish to be admitted to the hospital.  I had extensive discussion with him regarding risk and benefits, patient continues to decline admission, reports he can follow-up with his primary care tomorrow.  Discussed need for close follow-up with primary care, discussed referral to cardiology for further evaluation of syncope as well as dyspnea, discussed referral to neurology for further evaluation of abnormality seen on CT head imaging and need for outpatient MRI for further evaluation.  Discussed need to have repeat phosphorus checked by primary care.  Extensive discussion return precautions.  Patient discharged at his request. [JG]      ED Course User Index  [JG] Ozzy Simental MD       AS OF 18:16 EDT VITALS:    BP - 154/88  HR - 70  TEMP - 97 °F (36.1 °C)  O2 SATS - 96%    DIAGNOSIS  Final diagnoses:   Syncope, unspecified syncope type   Closed head injury, initial encounter   Abnormal CT of the head   Dyspnea, unspecified type   Hypophosphatemia   Hyperglycemia         DISPOSITION  DISCHARGE    Patient discharged in stable condition.    Reviewed implications of results, diagnosis, meds, responsibility to follow up, warning signs and symptoms of possible worsening, potential complications and reasons to return to ER.    Patient/Family voiced understanding of above instructions.    Discussed plan for discharge. Patient referred to primary care provider for BP management due to today's BP. Pt/family is agreeable and understands need for follow up and repeat testing.  Pt is aware that discharge does not mean that nothing is wrong and they must continue care with follow-up as given below or physician of their choice.     FOLLOW-UP  Luis Armando Bolanos MD  [address]  [place] [address]  [place] [address]  [phone]    Schedule an appointment as soon as possible for a visit in 1 day  even if well, for further evaluation/management of your abnormality seen on CT head imaging with further evaluation with MRI, for further evaluation of syncope and dyspnea, for repeat phosphorus level    [facility] CARDIOLOGY  [address]  [place] [address] [phone]  Schedule an appointment as soon as possible for a visit in 2 days      [facility] NEUROLOGY  [address]  [place] [address] [phone]  Schedule an appointment as soon as possible for a visit in 2 days           Medication List      No changes were made to your prescriptions during this visit.            Ozzy Simental MD  04/13/25 0334

## 2025-04-13 NOTE — ED TRIAGE NOTES
Patient to ED via pv from home. Patient had a syncopal episode yesterday and has a laceration on his upper lip. Patient reports he has felt light headed today.

## 2025-04-13 NOTE — DISCHARGE INSTRUCTIONS
.You have been given emergency department evaluation.  This evaluation is intended to rule out life-threatening conditions.  Is not a complete evaluation.  You could require further testing as determined by your primary care physician or any referred specialist.  Please follow-up with all doctors that you are referred to.  Please be sure to take your prescribed medications and follow any specific instructions in the discharge instructions.  Please follow-up with your primary care physician within 48 hours.  Please have your primary care provider recheck your blood pressure.  Please return to the emergency department if you experience chest pain, shortness of breath, abdominal pain, fever greater than 102, intractable vomiting.  Please return to the emergency department if your symptoms continue or worsen, or if you begin to experience any other concerning symptom.

## 2025-04-14 LAB
QT INTERVAL: 399 MS
QTC INTERVAL: 452 MS

## 2025-04-21 ENCOUNTER — OFFICE VISIT (OUTPATIENT)
Age: 60
End: 2025-04-21
Payer: COMMERCIAL

## 2025-04-21 VITALS
OXYGEN SATURATION: 96 % | RESPIRATION RATE: 14 BRPM | HEIGHT: 73 IN | SYSTOLIC BLOOD PRESSURE: 118 MMHG | BODY MASS INDEX: 27.96 KG/M2 | DIASTOLIC BLOOD PRESSURE: 70 MMHG | HEART RATE: 84 BPM | TEMPERATURE: 97 F | WEIGHT: 211 LBS

## 2025-04-21 DIAGNOSIS — R53.83 FATIGUE, UNSPECIFIED TYPE: ICD-10-CM

## 2025-04-21 DIAGNOSIS — R93.0 ABNORMAL CT OF THE HEAD: ICD-10-CM

## 2025-04-21 DIAGNOSIS — R55 SYNCOPE AND COLLAPSE: Primary | ICD-10-CM

## 2025-04-21 DIAGNOSIS — R07.89 ATYPICAL CHEST PAIN: ICD-10-CM

## 2025-04-21 DIAGNOSIS — R06.09 DYSPNEA ON EXERTION: ICD-10-CM

## 2025-04-21 PROCEDURE — 99214 OFFICE O/P EST MOD 30 MIN: CPT | Performed by: FAMILY MEDICINE

## 2025-04-21 NOTE — PROGRESS NOTES
"Chief Complaint  follow up from ER/Syncope    Subjective        Ochoa Mojica presents to [facility]  History of Present Illness    History of Present Illness  The patient presents for evaluation of lightheadedness, fatigue, and atypical chest pain.    An episode of lightheadedness occurred last weekend while moving furniture from his deck. During this incident, he bent over, lost consciousness momentarily, and sustained facial and head injuries. His wife took him to the ER, where he was advised to stay for observation. However, he chose to follow up with the family medicine clinician and a cardiologist instead. A CT scan of his head revealed an abnormality, prompting a recommendation for an MRI.     Significant fatigue has been reported for the past month, exacerbated by physical exertion such as moving furniture. This fatigue has been present for several months, accompanied by mild shortness of breath, particularly when ascending or descending stairs. Daily activities have been limited due to this fatigue. No excessive snoring is reported, and there has been no evaluation for obstructive sleep apnea. Blood pressure was within normal range during the hospital visit. Since discharge, severe fatigue has persisted. Hospitalization lasted for a few hours, and an appointment with a cardiologist is scheduled for Wednesday.    He is currently on medication for hypercholesterolemia and lisinopril for hypertension.       Objective   Vital Signs:  /70 (BP Location: Left arm, Patient Position: Sitting, Cuff Size: Adult)   Pulse 84   Temp 97 °F (36.1 °C) (Temporal)   Resp 14   Ht 185.4 cm (72.99\")   Wt 95.7 kg (211 lb)   SpO2 96%   BMI 27.84 kg/m²   Estimated body mass index is 27.84 kg/m² as calculated from the following:    Height as of this encounter: 185.4 cm (72.99\").    Weight as of this encounter: 95.7 kg (211 lb).    BMI is >= 25 and <30. (Overweight) The following options " were offered after discussion;: weight loss educational material (shared in after visit summary), exercise counseling/recommendations, and nutrition counseling/recommendations       Physical Exam  Constitutional:       General: He is not in acute distress.     Appearance: Normal appearance. He is not ill-appearing, toxic-appearing or diaphoretic.   HENT:      Head: Normocephalic and atraumatic.      Right Ear: There is no impacted cerumen.      Left Ear: There is no impacted cerumen.      Nose: No congestion or rhinorrhea.      Mouth/Throat:      Pharynx: Oropharynx is clear. No oropharyngeal exudate or posterior oropharyngeal erythema.   Eyes:      General: No scleral icterus.        Right eye: No discharge.         Left eye: No discharge.      Extraocular Movements: Extraocular movements intact.      Conjunctiva/sclera: Conjunctivae normal.      Pupils: Pupils are equal, round, and reactive to light.   Cardiovascular:      Rate and Rhythm: Normal rate and regular rhythm.      Pulses: Normal pulses.      Heart sounds: Normal heart sounds.   Pulmonary:      Effort: Pulmonary effort is normal.      Breath sounds: Normal breath sounds.   Abdominal:      General: Abdomen is flat. Bowel sounds are normal.      Palpations: Abdomen is soft.   Musculoskeletal:         General: Normal range of motion.      Cervical back: Normal range of motion and neck supple.   Skin:     General: Skin is warm.   Neurological:      General: No focal deficit present.      Mental Status: He is alert and oriented to person, place, and time. Mental status is at baseline. He is lethargic.      Cranial Nerves: Cranial nerves 2-12 are intact.      Sensory: Sensation is intact.      Motor: Motor function is intact.      Coordination: Coordination is intact.      Gait: Gait is intact.   Psychiatric:         Mood and Affect: Mood normal.         Behavior: Behavior normal.         Thought Content: Thought content normal.         Judgment: Judgment  normal.           Cardiovascular: Blood pressure is 110/68 in both lying down and upright positions, indicating no orthostatic hypotension.       Result Review :    Results  Labs   - Blood sugar: 117 mg/dL   - proBNP: 76.7 pg/mL   - Phosphorus: Low   - Hemoglobin: 16.3 g/dL   - D-dimer: Negative   - Troponin: Normal    Imaging   - CT scan of head: Small area of hypoattenuation seen in the anterior limb of the right internal capsule and adjacent caudate head, possibly a lacunar infarct or ischemic changes   - Chest x-ray: Normal    Diagnostic Testing   - EKG: Normal              Assessment and Plan   Diagnoses and all orders for this visit:    1. Syncope and collapse (Primary)  -     Ambulatory Referral to Cardiology    2. Fatigue, unspecified type  -     Ambulatory Referral to Cardiology    3. Dyspnea on exertion  -     Ambulatory Referral to Cardiology    4. Atypical chest pain  -     Ambulatory Referral to Cardiology    5. Abnormal CT of the head  -     MRI Brain Without Contrast; Future        Assessment & Plan  1. Lightheadedness.  - Blood pressure readings are consistent, ruling out orthostatic hypotension as a cause for the blackout episode.  - The etiology of the blackout remains uncertain.  - A referral to a cardiologist has been made for further evaluation.  - An MRI of the brain will be scheduled to investigate the observed abnormality on the CT scan.    2. Fatigue.  - Reports extreme tiredness over the past couple of months, worsening recently.  - Fatigue is affecting daily activities and is accompanied by shortness of breath during exertion.  - Further evaluation by the cardiologist is warranted to rule out any cardiac causes.  - Follow-up in 2 weeks.    3. Atypical chest pain.  - Experiences occasional chest tightness and mild chest pain, especially when coughing.  - This is considered atypical chest pain.  - Further evaluation by the cardiologist is necessary to rule out any cardiac issues.  -  Follow-up in 2 weeks.    4. Hypercholesterolemia.  - Currently on cholesterol medication.  - Medication effectiveness will be monitored.  - Review of records and counseling on lifestyle modifications.  - Follow-up in 2 weeks.    5. Hypertension.  - Currently taking lisinopril for blood pressure management.  - Blood pressure readings today were 110/68 in both lying down and upright positions.  - Medication effectiveness will be monitored.  - Follow-up in 2 weeks.          Follow Up   Return in about 2 weeks (around 5/5/2025).  Patient was given instructions and counseling regarding his condition or for health maintenance advice. Please see specific information pulled into the AVS if appropriate.         Patient or patient representative verbalized consent for the use of Ambient Listening during the visit with  Luis Armando Bolanos MD for chart documentation. 4/21/2025  10:08 EDT

## 2025-04-23 ENCOUNTER — OFFICE VISIT (OUTPATIENT)
Dept: CARDIOLOGY | Age: 60
End: 2025-04-23
Payer: COMMERCIAL

## 2025-04-23 VITALS
HEIGHT: 72 IN | HEART RATE: 87 BPM | DIASTOLIC BLOOD PRESSURE: 82 MMHG | BODY MASS INDEX: 28.66 KG/M2 | SYSTOLIC BLOOD PRESSURE: 115 MMHG | OXYGEN SATURATION: 98 % | WEIGHT: 211.6 LBS

## 2025-04-23 DIAGNOSIS — R07.2 PRECORDIAL PAIN: ICD-10-CM

## 2025-04-23 DIAGNOSIS — E78.2 MIXED HYPERLIPIDEMIA: ICD-10-CM

## 2025-04-23 DIAGNOSIS — R55 SYNCOPE AND COLLAPSE: Primary | ICD-10-CM

## 2025-04-23 DIAGNOSIS — R06.09 DYSPNEA ON EXERTION: ICD-10-CM

## 2025-04-23 DIAGNOSIS — R73.03 PREDIABETES: ICD-10-CM

## 2025-04-23 DIAGNOSIS — I10 ESSENTIAL HYPERTENSION: ICD-10-CM

## 2025-04-23 PROCEDURE — 93000 ELECTROCARDIOGRAM COMPLETE: CPT | Performed by: STUDENT IN AN ORGANIZED HEALTH CARE EDUCATION/TRAINING PROGRAM

## 2025-04-23 PROCEDURE — 99204 OFFICE O/P NEW MOD 45 MIN: CPT | Performed by: STUDENT IN AN ORGANIZED HEALTH CARE EDUCATION/TRAINING PROGRAM

## 2025-04-23 RX ORDER — SODIUM CHLORIDE 0.9 % (FLUSH) 0.9 %
10 SYRINGE (ML) INJECTION EVERY 12 HOURS SCHEDULED
OUTPATIENT
Start: 2025-04-23

## 2025-04-23 RX ORDER — METOPROLOL TARTRATE 50 MG
50 TABLET ORAL
OUTPATIENT
Start: 2025-04-23

## 2025-04-23 RX ORDER — IVABRADINE 5 MG/1
15 TABLET, FILM COATED ORAL ONCE
OUTPATIENT
Start: 2025-04-23 | End: 2025-04-23

## 2025-04-23 RX ORDER — METOPROLOL TARTRATE 1 MG/ML
5 INJECTION, SOLUTION INTRAVENOUS
OUTPATIENT
Start: 2025-04-23

## 2025-04-23 RX ORDER — NITROGLYCERIN 0.4 MG/1
0.8 TABLET SUBLINGUAL
OUTPATIENT
Start: 2025-04-23

## 2025-04-23 RX ORDER — METOPROLOL TARTRATE 100 MG/1
TABLET ORAL
Qty: 2 TABLET | Refills: 0 | Status: SHIPPED | OUTPATIENT
Start: 2025-04-23

## 2025-04-23 RX ORDER — SODIUM CHLORIDE 0.9 % (FLUSH) 0.9 %
10 SYRINGE (ML) INJECTION AS NEEDED
OUTPATIENT
Start: 2025-04-23

## 2025-04-23 RX ORDER — NITROGLYCERIN 0.4 MG/1
0.4 TABLET SUBLINGUAL
OUTPATIENT
Start: 2025-04-23 | End: 2025-04-23

## 2025-04-23 RX ORDER — METOPROLOL TARTRATE 25 MG/1
150 TABLET, FILM COATED ORAL ONCE
OUTPATIENT
Start: 2025-04-23

## 2025-04-23 RX ORDER — METOPROLOL TARTRATE 25 MG/1
200 TABLET, FILM COATED ORAL ONCE
OUTPATIENT
Start: 2025-04-23 | End: 2025-04-23

## 2025-04-23 RX ORDER — METOPROLOL TARTRATE 25 MG/1
100 TABLET, FILM COATED ORAL ONCE
OUTPATIENT
Start: 2025-04-23

## 2025-04-23 RX ORDER — METOPROLOL TARTRATE 25 MG/1
50 TABLET, FILM COATED ORAL ONCE
OUTPATIENT
Start: 2025-04-23

## 2025-04-23 RX ORDER — SODIUM CHLORIDE 9 MG/ML
40 INJECTION, SOLUTION INTRAVENOUS AS NEEDED
OUTPATIENT
Start: 2025-04-23

## 2025-04-23 NOTE — PROGRESS NOTES
[facility]      Patient Name: Ochoa Mojica  :1965  Age: 59 y.o.  Encounter Provider:  Flakito Toure MD      Chief Complaint:   Chief Complaint   Patient presents with    Syncope and dyspnea         HPI  Ochoa Mojica is a 59 y.o. male who presents today for evaluation of syncope and dyspnea. Pt has a  history significant for HTN, HLD, dyspnea.     He had an ER visit 2025 after he had a syncopal event where he suddenly lost consciousness while moving furniture.  No preceding symptoms.  He did sustain facial and head injuries.  He was advised to stay for observation but decided to discharge with close follow-up instead.  At time was notable for a lactate of 3.1, low phosphorus, normal troponin x 2, normal D-dimer and normal EKG .  CT head was concerning for possible old lacunar infarct.  MRI brain recommended and has been ordered as an outpatient.  Orthostatics were negative.    He tells me for the last month or so he has had significant fatigue and dyspnea on exertion.  He previously was not limited really at all.  He notes that cutting the grass has been more difficult this spring.  Also notes some chest pain that is nonexertional and nonradiating.  Substernal.  Last for few seconds.  Overall fairly atypical.  He also notes occasional palpitations.  He has not had another episode of syncope.  He reports that around the time of the syncope before and after he had no significant symptoms.  He has not had significant dizziness.    The following portions of the patient's history were reviewed and updated as appropriate: allergies, current medications, past family history, past medical history, past social history, past surgical history and problem list.      Previous Cardiac Testing:  None    OBJECTIVE:   Vital Signs  Vitals:    25 0803   BP: 115/82   Pulse: 87   SpO2: 98%     Estimated body mass index is 28.7 kg/m² as calculated from the following:    Height as of  "this encounter: 182.9 cm (72\").    Weight as of this encounter: 96 kg (211 lb 9.6 oz).    Constitutional:       Appearance: Healthy appearance. Not in distress.   Neck:      Vascular: JVD normal.   Pulmonary:      Effort: Pulmonary effort is normal.      Breath sounds: Normal breath sounds. No wheezing. No rhonchi. No rales.   Cardiovascular:      PMI at left midclavicular line. Normal rate. Regular rhythm. Normal S1. Normal S2.       Murmurs: There is no murmur.      No gallop.  No click. No rub.   Pulses:     Intact distal pulses.   Edema:     Peripheral edema absent.   Abdominal:      Palpations: Abdomen is soft.      Tenderness: There is no abdominal tenderness.   Musculoskeletal: Normal range of motion. Skin:     General: Skin is warm and dry.   Neurological:      General: No focal deficit present.      Mental Status: Alert and oriented to person, place and time.           ECG 12 Lead    Date/Time: 4/23/2025 8:16 AM  Performed by: Flakito Toure MD    Authorized by: Flakito Toure MD  Comparison: compared with previous ECG from 4/13/2025  Similar to previous ECG  Rhythm: sinus rhythm  Rate: normal  Conduction: conduction normal  ST Segments: ST segments normal  T Waves: T waves normal  QRS axis: normal    Clinical impression: normal ECG          Lipid Panel          9/27/2024    09:42   Lipid Panel   Total Cholesterol 114    Triglycerides 60    HDL Cholesterol 35    VLDL Cholesterol 13    LDL Cholesterol  66         BUN   Date Value Ref Range Status   04/13/2025 18 8 - 23 mg/dL Final     Creatinine   Date Value Ref Range Status   04/13/2025 0.93 0.76 - 1.27 mg/dL Final     Potassium   Date Value Ref Range Status   04/13/2025 4.0 3.5 - 5.2 mmol/L Final     ALT (SGPT)   Date Value Ref Range Status   04/13/2025 34 1 - 41 U/L Final     AST (SGOT)   Date Value Ref Range Status   04/13/2025 37 1 - 40 U/L Final           ASSESSMENT:      Diagnosis Plan   1. Syncope and collapse  ECG 12 Lead    CT Angiogram " Coronary    CT Angiogram Coronary-Cardiology Interpretation    metoprolol tartrate (LOPRESSOR) tablet 200 mg    metoprolol tartrate (LOPRESSOR) tablet 150 mg    metoprolol tartrate (LOPRESSOR) tablet 100 mg    metoprolol tartrate (LOPRESSOR) tablet 50 mg    metoprolol tartrate (LOPRESSOR) tablet 50 mg    metoprolol tartrate (LOPRESSOR) injection 5 mg    nitroglycerin (NITROSTAT) SL tablet 0.4 mg    nitroglycerin (NITROSTAT) SL tablet 0.8 mg    ivabradine HCl (CORLANOR) tablet 15 mg    No Caffeine or Nicotine 4 Hours Prior to CTA Appointment    Nothing to Eat or Drink 4 Hours Prior to CTA Appointment    Do Not Take Phosphodiasterase Inhibitors in the 72 Hours Prior to Coronary CTA    Obtain Informed Consent - Computed Tomography Angiography of Chest - Angiogram of Coronary Arteries    Vital Signs Upon Arrival    Cardiac Monitoring    Verify NPO Status - Patient to Be NPO at Least 4 Hours Prior to CTA    Notify CT After Administration of metoprolol tartrate (LOPRESSOR) tablet    Notify Provider If Total Metoprolol Given Equals 300mg & Heart Rate Not At Goal    Notify Provider Prior to Administration of Nitroglycerin if Patient SBP <80    POC Creatinine    Insert Peripheral IV    Saline Lock & Maintain IV Access    sodium chloride 0.9 % flush 10 mL    sodium chloride 0.9 % flush 10 mL    sodium chloride 0.9 % infusion 40 mL    Vital Signs - See Instructions    Hold Medication Metformin (Glucophage, Glucophage XR, Fortament, Glumetza);  Metglip (metformin/glipizide);  Glucovance (metformin/glyburide); Avandamet (metformin/rosiglitazone)    Patient May Discharge Home After Procedure Complete (If Stable)    metoprolol tartrate (LOPRESSOR) 100 MG tablet    Adult Transthoracic Echo Complete w/ Color, Spectral and Contrast if Necessary Per Protocol    Holter Monitor - 72 Hour Up To 15 Days      2. Dyspnea on exertion  ECG 12 Lead    CT Angiogram Coronary    CT Angiogram Coronary-Cardiology Interpretation    metoprolol  tartrate (LOPRESSOR) tablet 200 mg    metoprolol tartrate (LOPRESSOR) tablet 150 mg    metoprolol tartrate (LOPRESSOR) tablet 100 mg    metoprolol tartrate (LOPRESSOR) tablet 50 mg    metoprolol tartrate (LOPRESSOR) tablet 50 mg    metoprolol tartrate (LOPRESSOR) injection 5 mg    nitroglycerin (NITROSTAT) SL tablet 0.4 mg    nitroglycerin (NITROSTAT) SL tablet 0.8 mg    ivabradine HCl (CORLANOR) tablet 15 mg    No Caffeine or Nicotine 4 Hours Prior to CTA Appointment    Nothing to Eat or Drink 4 Hours Prior to CTA Appointment    Do Not Take Phosphodiasterase Inhibitors in the 72 Hours Prior to Coronary CTA    Obtain Informed Consent - Computed Tomography Angiography of Chest - Angiogram of Coronary Arteries    Vital Signs Upon Arrival    Cardiac Monitoring    Verify NPO Status - Patient to Be NPO at Least 4 Hours Prior to CTA    Notify CT After Administration of metoprolol tartrate (LOPRESSOR) tablet    Notify Provider If Total Metoprolol Given Equals 300mg & Heart Rate Not At Goal    Notify Provider Prior to Administration of Nitroglycerin if Patient SBP <80    POC Creatinine    Insert Peripheral IV    Saline Lock & Maintain IV Access    sodium chloride 0.9 % flush 10 mL    sodium chloride 0.9 % flush 10 mL    sodium chloride 0.9 % infusion 40 mL    Vital Signs - See Instructions    Hold Medication Metformin (Glucophage, Glucophage XR, Fortament, Glumetza);  Metglip (metformin/glipizide);  Glucovance (metformin/glyburide); Avandamet (metformin/rosiglitazone)    Patient May Discharge Home After Procedure Complete (If Stable)    metoprolol tartrate (LOPRESSOR) 100 MG tablet    Adult Transthoracic Echo Complete w/ Color, Spectral and Contrast if Necessary Per Protocol      3. Essential hypertension  ECG 12 Lead    CT Angiogram Coronary    CT Angiogram Coronary-Cardiology Interpretation    metoprolol tartrate (LOPRESSOR) tablet 200 mg    metoprolol tartrate (LOPRESSOR) tablet 150 mg    metoprolol tartrate (LOPRESSOR)  tablet 100 mg    metoprolol tartrate (LOPRESSOR) tablet 50 mg    metoprolol tartrate (LOPRESSOR) tablet 50 mg    metoprolol tartrate (LOPRESSOR) injection 5 mg    nitroglycerin (NITROSTAT) SL tablet 0.4 mg    nitroglycerin (NITROSTAT) SL tablet 0.8 mg    ivabradine HCl (CORLANOR) tablet 15 mg    No Caffeine or Nicotine 4 Hours Prior to CTA Appointment    Nothing to Eat or Drink 4 Hours Prior to CTA Appointment    Do Not Take Phosphodiasterase Inhibitors in the 72 Hours Prior to Coronary CTA    Obtain Informed Consent - Computed Tomography Angiography of Chest - Angiogram of Coronary Arteries    Vital Signs Upon Arrival    Cardiac Monitoring    Verify NPO Status - Patient to Be NPO at Least 4 Hours Prior to CTA    Notify CT After Administration of metoprolol tartrate (LOPRESSOR) tablet    Notify Provider If Total Metoprolol Given Equals 300mg & Heart Rate Not At Goal    Notify Provider Prior to Administration of Nitroglycerin if Patient SBP <80    POC Creatinine    Insert Peripheral IV    Saline Lock & Maintain IV Access    sodium chloride 0.9 % flush 10 mL    sodium chloride 0.9 % flush 10 mL    sodium chloride 0.9 % infusion 40 mL    Vital Signs - See Instructions    Hold Medication Metformin (Glucophage, Glucophage XR, Fortament, Glumetza);  Metglip (metformin/glipizide);  Glucovance (metformin/glyburide); Avandamet (metformin/rosiglitazone)    Patient May Discharge Home After Procedure Complete (If Stable)    metoprolol tartrate (LOPRESSOR) 100 MG tablet      4. Mixed hyperlipidemia  ECG 12 Lead    CT Angiogram Coronary    CT Angiogram Coronary-Cardiology Interpretation    metoprolol tartrate (LOPRESSOR) tablet 200 mg    metoprolol tartrate (LOPRESSOR) tablet 150 mg    metoprolol tartrate (LOPRESSOR) tablet 100 mg    metoprolol tartrate (LOPRESSOR) tablet 50 mg    metoprolol tartrate (LOPRESSOR) tablet 50 mg    metoprolol tartrate (LOPRESSOR) injection 5 mg    nitroglycerin (NITROSTAT) SL tablet 0.4 mg     nitroglycerin (NITROSTAT) SL tablet 0.8 mg    ivabradine HCl (CORLANOR) tablet 15 mg    No Caffeine or Nicotine 4 Hours Prior to CTA Appointment    Nothing to Eat or Drink 4 Hours Prior to CTA Appointment    Do Not Take Phosphodiasterase Inhibitors in the 72 Hours Prior to Coronary CTA    Obtain Informed Consent - Computed Tomography Angiography of Chest - Angiogram of Coronary Arteries    Vital Signs Upon Arrival    Cardiac Monitoring    Verify NPO Status - Patient to Be NPO at Least 4 Hours Prior to CTA    Notify CT After Administration of metoprolol tartrate (LOPRESSOR) tablet    Notify Provider If Total Metoprolol Given Equals 300mg & Heart Rate Not At Goal    Notify Provider Prior to Administration of Nitroglycerin if Patient SBP <80    POC Creatinine    Insert Peripheral IV    Saline Lock & Maintain IV Access    sodium chloride 0.9 % flush 10 mL    sodium chloride 0.9 % flush 10 mL    sodium chloride 0.9 % infusion 40 mL    Vital Signs - See Instructions    Hold Medication Metformin (Glucophage, Glucophage XR, Fortament, Glumetza);  Metglip (metformin/glipizide);  Glucovance (metformin/glyburide); Avandamet (metformin/rosiglitazone)    Patient May Discharge Home After Procedure Complete (If Stable)    metoprolol tartrate (LOPRESSOR) 100 MG tablet      5. Prediabetes  ECG 12 Lead    CT Angiogram Coronary    CT Angiogram Coronary-Cardiology Interpretation    metoprolol tartrate (LOPRESSOR) tablet 200 mg    metoprolol tartrate (LOPRESSOR) tablet 150 mg    metoprolol tartrate (LOPRESSOR) tablet 100 mg    metoprolol tartrate (LOPRESSOR) tablet 50 mg    metoprolol tartrate (LOPRESSOR) tablet 50 mg    metoprolol tartrate (LOPRESSOR) injection 5 mg    nitroglycerin (NITROSTAT) SL tablet 0.4 mg    nitroglycerin (NITROSTAT) SL tablet 0.8 mg    ivabradine HCl (CORLANOR) tablet 15 mg    No Caffeine or Nicotine 4 Hours Prior to CTA Appointment    Nothing to Eat or Drink 4 Hours Prior to CTA Appointment    Do Not Take  Phosphodiasterase Inhibitors in the 72 Hours Prior to Coronary CTA    Obtain Informed Consent - Computed Tomography Angiography of Chest - Angiogram of Coronary Arteries    Vital Signs Upon Arrival    Cardiac Monitoring    Verify NPO Status - Patient to Be NPO at Least 4 Hours Prior to CTA    Notify CT After Administration of metoprolol tartrate (LOPRESSOR) tablet    Notify Provider If Total Metoprolol Given Equals 300mg & Heart Rate Not At Goal    Notify Provider Prior to Administration of Nitroglycerin if Patient SBP <80    POC Creatinine    Insert Peripheral IV    Saline Lock & Maintain IV Access    sodium chloride 0.9 % flush 10 mL    sodium chloride 0.9 % flush 10 mL    sodium chloride 0.9 % infusion 40 mL    Vital Signs - See Instructions    Hold Medication Metformin (Glucophage, Glucophage XR, Fortament, Glumetza);  Metglip (metformin/glipizide);  Glucovance (metformin/glyburide); Avandamet (metformin/rosiglitazone)    Patient May Discharge Home After Procedure Complete (If Stable)    metoprolol tartrate (LOPRESSOR) 100 MG tablet      6. Precordial pain  CT Angiogram Coronary    CT Angiogram Coronary-Cardiology Interpretation    metoprolol tartrate (LOPRESSOR) tablet 200 mg    metoprolol tartrate (LOPRESSOR) tablet 150 mg    metoprolol tartrate (LOPRESSOR) tablet 100 mg    metoprolol tartrate (LOPRESSOR) tablet 50 mg    metoprolol tartrate (LOPRESSOR) tablet 50 mg    metoprolol tartrate (LOPRESSOR) injection 5 mg    nitroglycerin (NITROSTAT) SL tablet 0.4 mg    nitroglycerin (NITROSTAT) SL tablet 0.8 mg    ivabradine HCl (CORLANOR) tablet 15 mg    No Caffeine or Nicotine 4 Hours Prior to CTA Appointment    Nothing to Eat or Drink 4 Hours Prior to CTA Appointment    Do Not Take Phosphodiasterase Inhibitors in the 72 Hours Prior to Coronary CTA    Obtain Informed Consent - Computed Tomography Angiography of Chest - Angiogram of Coronary Arteries    Vital Signs Upon Arrival    Cardiac Monitoring    Verify NPO  Status - Patient to Be NPO at Least 4 Hours Prior to CTA    Notify CT After Administration of metoprolol tartrate (LOPRESSOR) tablet    Notify Provider If Total Metoprolol Given Equals 300mg & Heart Rate Not At Goal    Notify Provider Prior to Administration of Nitroglycerin if Patient SBP <80    POC Creatinine    Insert Peripheral IV    Saline Lock & Maintain IV Access    sodium chloride 0.9 % flush 10 mL    sodium chloride 0.9 % flush 10 mL    sodium chloride 0.9 % infusion 40 mL    Vital Signs - See Instructions    Hold Medication Metformin (Glucophage, Glucophage XR, Fortament, Glumetza);  Metglip (metformin/glipizide);  Glucovance (metformin/glyburide); Avandamet (metformin/rosiglitazone)    Patient May Discharge Home After Procedure Complete (If Stable)    metoprolol tartrate (LOPRESSOR) 100 MG tablet            PLAN OF CARE:     Syncope  Dyspnea  Chest pain  Hypertension: Well-controlled on lisinopril and verapamil  Hyperlipidemia: Most recent LDL 9/2024 of 66.  Continue Lipitor 20 mg   Prediabetes: A1c 6.3 9/2024    Unclear etiology of symptoms.  Rather sudden onset it seems.  Workup thus far with unremarkable including normal thyroid, negative troponin when he went to the ER, negative D-dimer, grossly normal EKG, normal orthostatics.  Does certainly have cardiovascular risk factors with hypertension, hyperlipidemia, prediabetes though all are pretty well-controlled.  The chest pain is rather atypical but in the setting of significant fatigue and new onset dyspnea on exertion I think we do need to rule out cardiovascular etiology.  I will obtain an echocardiogram for cardiac structure and function and ensure no valvular disease (I do not hear significant murmur on exam today and no signs of decompensated heart failure), coronary CTA to look for obstructive coronary disease, and a Holter monitor to assess for an arrhythmia as etiology of his symptoms.    6 weeks         Discharge Medications             Accurate as of April 23, 2025  8:38 AM. If you have any questions, ask your nurse or doctor.                New Medications        Instructions Start Date   metoprolol tartrate 100 MG tablet  Commonly known as: LOPRESSOR  Started by: Flakito Toure   Take 100 mg at Bedtime the Night Before Coronary CTA Appointment and In the Morning 1 Hour Prior to Coronary CTA Appointment. Do not take if heart rate less than 60.             Continue These Medications        Instructions Start Date   alfuzosin 10 MG 24 hr tablet  Commonly known as: UROXATRAL   10 mg, Daily      atorvastatin 20 MG tablet  Commonly known as: LIPITOR   Oral, Daily      escitalopram 20 MG tablet  Commonly known as: LEXAPRO   20 mg, Daily      gabapentin 100 MG capsule  Commonly known as: NEURONTIN   100 mg, 3 Times Daily      lisinopril 20 MG tablet  Commonly known as: PRINIVIL,ZESTRIL   20 mg, Daily      verapamil  MG CR tablet  Commonly known as: CALAN-SR   180 mg, Oral, Daily               Thank you for allowing me to participate in the care of your patient,      Sincerely,   Flakito Toure MD  [facility]  04/23/25  08:38 EDT

## 2025-04-24 ENCOUNTER — PATIENT ROUNDING (BHMG ONLY) (OUTPATIENT)
Age: 60
End: 2025-04-24
Payer: COMMERCIAL

## 2025-04-24 NOTE — PROGRESS NOTES
A My Chart message has been sent to the patient for PATIENT ROUNDING with [facility]

## 2025-05-05 ENCOUNTER — HOSPITAL ENCOUNTER (OUTPATIENT)
Dept: CARDIOLOGY | Facility: HOSPITAL | Age: 60
End: 2025-05-05
Payer: COMMERCIAL

## 2025-05-05 ENCOUNTER — APPOINTMENT (OUTPATIENT)
Dept: CT IMAGING | Facility: HOSPITAL | Age: 60
End: 2025-05-05
Payer: COMMERCIAL

## 2025-05-05 ENCOUNTER — HOSPITAL ENCOUNTER (OUTPATIENT)
Dept: CARDIOLOGY | Facility: HOSPITAL | Age: 60
Discharge: HOME | End: 2025-05-05
Payer: COMMERCIAL

## 2025-05-05 ENCOUNTER — TELEPHONE (OUTPATIENT)
Dept: CARDIOLOGY | Facility: HOSPITAL | Age: 60
End: 2025-05-05

## 2025-05-05 VITALS
HEIGHT: 72 IN | SYSTOLIC BLOOD PRESSURE: 69 MMHG | WEIGHT: 211 LBS | DIASTOLIC BLOOD PRESSURE: 62 MMHG | BODY MASS INDEX: 28.58 KG/M2 | HEART RATE: 43 BPM

## 2025-05-05 DIAGNOSIS — R55 SYNCOPE AND COLLAPSE: ICD-10-CM

## 2025-05-05 DIAGNOSIS — R73.03 PREDIABETES: ICD-10-CM

## 2025-05-05 DIAGNOSIS — R06.09 DYSPNEA ON EXERTION: ICD-10-CM

## 2025-05-05 DIAGNOSIS — E78.2 MIXED HYPERLIPIDEMIA: ICD-10-CM

## 2025-05-05 DIAGNOSIS — R07.2 PRECORDIAL PAIN: ICD-10-CM

## 2025-05-05 DIAGNOSIS — I10 ESSENTIAL HYPERTENSION: ICD-10-CM

## 2025-05-05 LAB
AORTIC DIMENSIONLESS INDEX: 0.73 (DI)
ASCENDING AORTA: 3.4 CM
AV MEAN PRESS GRAD SYS DOP V1V2: 3.9 MMHG
AV VMAX SYS DOP: 126.4 CM/SEC
BH CV ECHO LEFT VENTRICLE GLOBAL LONGITUDINAL STRAIN: -20.8 %
BH CV ECHO MEAS - ACS: 2.5 CM
BH CV ECHO MEAS - AI P1/2T: 1780 MSEC
BH CV ECHO MEAS - AO MAX PG: 6.4 MMHG
BH CV ECHO MEAS - AO ROOT DIAM: 4.5 CM
BH CV ECHO MEAS - AO V2 VTI: 33.9 CM
BH CV ECHO MEAS - AVA(I,D): 2.6 CM2
BH CV ECHO MEAS - EDV(CUBED): 103.8 ML
BH CV ECHO MEAS - EDV(MOD-SP2): 149 ML
BH CV ECHO MEAS - EDV(MOD-SP4): 166 ML
BH CV ECHO MEAS - EF(MOD-SP2): 61.7 %
BH CV ECHO MEAS - EF(MOD-SP4): 58.4 %
BH CV ECHO MEAS - ESV(CUBED): 25.8 ML
BH CV ECHO MEAS - ESV(MOD-SP2): 57 ML
BH CV ECHO MEAS - ESV(MOD-SP4): 69 ML
BH CV ECHO MEAS - FS: 37.1 %
BH CV ECHO MEAS - IVS/LVPW: 1.25 CM
BH CV ECHO MEAS - IVSD: 1 CM
BH CV ECHO MEAS - LAT PEAK E' VEL: 9.8 CM/SEC
BH CV ECHO MEAS - LV DIASTOLIC VOL/BSA (35-75): 76.2 CM2
BH CV ECHO MEAS - LV MASS(C)D: 142.7 GRAMS
BH CV ECHO MEAS - LV MAX PG: 5.4 MMHG
BH CV ECHO MEAS - LV MEAN PG: 2.5 MMHG
BH CV ECHO MEAS - LV SYSTOLIC VOL/BSA (12-30): 31.7 CM2
BH CV ECHO MEAS - LV V1 MAX: 116.2 CM/SEC
BH CV ECHO MEAS - LV V1 VTI: 24.8 CM
BH CV ECHO MEAS - LVIDD: 4.7 CM
BH CV ECHO MEAS - LVIDS: 3 CM
BH CV ECHO MEAS - LVOT AREA: 3.5 CM2
BH CV ECHO MEAS - LVOT DIAM: 2.11 CM
BH CV ECHO MEAS - LVPWD: 0.8 CM
BH CV ECHO MEAS - MED PEAK E' VEL: 8.8 CM/SEC
BH CV ECHO MEAS - MV A DUR: 0.2 SEC
BH CV ECHO MEAS - MV A MAX VEL: 65 CM/SEC
BH CV ECHO MEAS - MV DEC SLOPE: 296.7 CM/SEC2
BH CV ECHO MEAS - MV DEC TIME: 0.16 SEC
BH CV ECHO MEAS - MV E MAX VEL: 74.4 CM/SEC
BH CV ECHO MEAS - MV E/A: 1.14
BH CV ECHO MEAS - MV MAX PG: 2.37 MMHG
BH CV ECHO MEAS - MV MEAN PG: 0.57 MMHG
BH CV ECHO MEAS - MV P1/2T: 66.4 MSEC
BH CV ECHO MEAS - MV V2 VTI: 29.6 CM
BH CV ECHO MEAS - MVA(P1/2T): 3.3 CM2
BH CV ECHO MEAS - MVA(VTI): 2.9 CM2
BH CV ECHO MEAS - PA V2 MAX: 74.1 CM/SEC
BH CV ECHO MEAS - PULM A REVS DUR: 0.13 SEC
BH CV ECHO MEAS - PULM A REVS VEL: 24 CM/SEC
BH CV ECHO MEAS - PULM DIAS VEL: 25.8 CM/SEC
BH CV ECHO MEAS - PULM S/D: 0.85
BH CV ECHO MEAS - PULM SYS VEL: 22 CM/SEC
BH CV ECHO MEAS - QP/QS: 0.42
BH CV ECHO MEAS - RAP SYSTOLE: 3 MMHG
BH CV ECHO MEAS - RV MAX PG: 0.79 MMHG
BH CV ECHO MEAS - RV V1 MAX: 44.5 CM/SEC
BH CV ECHO MEAS - RV V1 VTI: 11.3 CM
BH CV ECHO MEAS - RVOT DIAM: 2.04 CM
BH CV ECHO MEAS - SV(LVOT): 86.9 ML
BH CV ECHO MEAS - SV(MOD-SP2): 92 ML
BH CV ECHO MEAS - SV(MOD-SP4): 97 ML
BH CV ECHO MEAS - SV(RVOT): 36.8 ML
BH CV ECHO MEAS - SVI(LVOT): 39.9 ML/M2
BH CV ECHO MEAS - SVI(MOD-SP2): 42.2 ML/M2
BH CV ECHO MEAS - SVI(MOD-SP4): 44.5 ML/M2
BH CV ECHO MEAS - TAPSE (>1.6): 2.33 CM
BH CV ECHO MEASUREMENTS AVERAGE E/E' RATIO: 8
BH CV XLRA - RV BASE: 3.7 CM
BH CV XLRA - RV LENGTH: 6.9 CM
BH CV XLRA - RV MID: 3.2 CM
BH CV XLRA - TDI S': 10.9 CM/SEC
LEFT ATRIUM VOLUME INDEX: 30.6 ML/M2
LV EF BIPLANE MOD: 59.5 %
SINUS: 3.5 CM
STJ: 3.3 CM

## 2025-05-05 PROCEDURE — 93356 MYOCRD STRAIN IMG SPCKL TRCK: CPT

## 2025-05-05 PROCEDURE — 93306 TTE W/DOPPLER COMPLETE: CPT | Performed by: INTERNAL MEDICINE

## 2025-05-05 PROCEDURE — 93306 TTE W/DOPPLER COMPLETE: CPT

## 2025-05-05 PROCEDURE — 93356 MYOCRD STRAIN IMG SPCKL TRCK: CPT | Performed by: INTERNAL MEDICINE

## 2025-05-05 NOTE — TELEPHONE ENCOUNTER
PT was here for ECHO this am. Then went to 4th floor for Zio placement. PT will need to be rescheduled for CCTA at a later date due to scanner down.

## 2025-05-06 ENCOUNTER — RESULTS FOLLOW-UP (OUTPATIENT)
Dept: CARDIOLOGY | Age: 60
End: 2025-05-06
Payer: COMMERCIAL

## 2025-05-06 DIAGNOSIS — R55 SYNCOPE AND COLLAPSE: ICD-10-CM

## 2025-05-06 DIAGNOSIS — R07.2 PRECORDIAL PAIN: ICD-10-CM

## 2025-05-06 DIAGNOSIS — I10 ESSENTIAL HYPERTENSION: ICD-10-CM

## 2025-05-06 DIAGNOSIS — R73.03 PREDIABETES: ICD-10-CM

## 2025-05-06 DIAGNOSIS — R06.09 DYSPNEA ON EXERTION: ICD-10-CM

## 2025-05-06 DIAGNOSIS — E78.2 MIXED HYPERLIPIDEMIA: ICD-10-CM

## 2025-05-06 RX ORDER — METOPROLOL TARTRATE 100 MG/1
TABLET ORAL
Qty: 2 TABLET | Refills: 0 | Status: SHIPPED | OUTPATIENT
Start: 2025-05-06

## 2025-05-06 NOTE — PROGRESS NOTES
Please inform patient his echo shows that his overall pulm function is strong which is good.  The aortic valve shows a prolapse which means that the leaflets do not meet equally which causes the valve to leak moderately.  The aortic root measures 4.5 cm which is enlarged but not critical so this can be monitored.  Patient is wearing a Zio patch and will have his CTA coronary completed later this month.  The CTA of his heart will give us even more information on his heart structure and function and he will keep the appointment in early June to follow-up with Dr. Toure.

## 2025-05-12 ENCOUNTER — TELEPHONE (OUTPATIENT)
Dept: CARDIOLOGY | Facility: HOSPITAL | Age: 60
End: 2025-05-12
Payer: COMMERCIAL

## 2025-05-12 NOTE — TELEPHONE ENCOUNTER
Called patient and verified that he had received Metoprolol Rx (take as directed) and go over general pre procedure instructions. Patient did not have any questions-Meena LOPEZ RN  
Spontaneous, unlabored and symmetrical

## 2025-05-13 ENCOUNTER — HOSPITAL ENCOUNTER (OUTPATIENT)
Dept: CARDIOLOGY | Facility: HOSPITAL | Age: 60
Discharge: HOME | End: 2025-05-13
Payer: COMMERCIAL

## 2025-05-13 ENCOUNTER — HOSPITAL ENCOUNTER (OUTPATIENT)
Dept: CT IMAGING | Facility: HOSPITAL | Age: 60
Discharge: HOME | End: 2025-05-13
Payer: COMMERCIAL

## 2025-05-13 VITALS — DIASTOLIC BLOOD PRESSURE: 61 MMHG | SYSTOLIC BLOOD PRESSURE: 103 MMHG | RESPIRATION RATE: 16 BRPM | HEART RATE: 43 BPM

## 2025-05-13 DIAGNOSIS — I10 ESSENTIAL HYPERTENSION: ICD-10-CM

## 2025-05-13 DIAGNOSIS — R55 SYNCOPE AND COLLAPSE: ICD-10-CM

## 2025-05-13 DIAGNOSIS — E78.2 MIXED HYPERLIPIDEMIA: ICD-10-CM

## 2025-05-13 DIAGNOSIS — R06.09 DYSPNEA ON EXERTION: ICD-10-CM

## 2025-05-13 DIAGNOSIS — R73.03 PREDIABETES: ICD-10-CM

## 2025-05-13 DIAGNOSIS — R07.2 PRECORDIAL PAIN: ICD-10-CM

## 2025-05-13 PROCEDURE — 36415 COLL VENOUS BLD VENIPUNCTURE: CPT

## 2025-05-13 PROCEDURE — 75574 CT ANGIO HRT W/3D IMAGE: CPT | Performed by: STUDENT IN AN ORGANIZED HEALTH CARE EDUCATION/TRAINING PROGRAM

## 2025-05-13 PROCEDURE — 25510000001 IOPAMIDOL PER 1 ML: Performed by: STUDENT IN AN ORGANIZED HEALTH CARE EDUCATION/TRAINING PROGRAM

## 2025-05-13 PROCEDURE — 75574 CT ANGIO HRT W/3D IMAGE: CPT

## 2025-05-13 RX ORDER — IOPAMIDOL 755 MG/ML
100 INJECTION, SOLUTION INTRAVASCULAR
Status: COMPLETED | OUTPATIENT
Start: 2025-05-13 | End: 2025-05-13

## 2025-05-13 RX ORDER — NITROGLYCERIN 0.4 MG/1
0.8 TABLET SUBLINGUAL ONCE
Status: DISCONTINUED | OUTPATIENT
Start: 2025-05-13 | End: 2025-05-14 | Stop reason: HOSPADM

## 2025-05-13 RX ADMIN — IOPAMIDOL 100 ML: 755 INJECTION, SOLUTION INTRAVENOUS at 12:51

## 2025-05-13 NOTE — PROGRESS NOTES
PT HR 40's upon arrival. PT states that he took scheduled dose of BB last PM and today. Other VS WNL. No c/o. PT has 20g IV to LAC placed by EL Borden CT tech. PT ready for CT scan.

## 2025-06-02 ENCOUNTER — TELEPHONE (OUTPATIENT)
Age: 60
End: 2025-06-02

## 2025-06-02 NOTE — TELEPHONE ENCOUNTER
"    Caller: Ochoa Mojica \"Ed\"    Relationship: Self    Best call back number: [phone]     Caller requesting test results: MRI     When was the test performed: 5.27.25    Where was the test performed: [facility]    Additional notes: PLEASE CALL PATIENT TO DISCUSS RESULTS, AND LET HIM KNOW IF HE NEEDS AN APPOINTMENT.    THANK YOU          "

## 2025-06-04 ENCOUNTER — OFFICE VISIT (OUTPATIENT)
Dept: CARDIOLOGY | Age: 60
End: 2025-06-04
Payer: COMMERCIAL

## 2025-06-04 VITALS
BODY MASS INDEX: 28.44 KG/M2 | SYSTOLIC BLOOD PRESSURE: 96 MMHG | DIASTOLIC BLOOD PRESSURE: 54 MMHG | HEIGHT: 72 IN | HEART RATE: 61 BPM | WEIGHT: 210 LBS

## 2025-06-04 DIAGNOSIS — I25.810 CORONARY ARTERY DISEASE INVOLVING CORONARY BYPASS GRAFT OF NATIVE HEART WITHOUT ANGINA PECTORIS: Primary | ICD-10-CM

## 2025-06-04 DIAGNOSIS — I77.810 AORTIC ROOT DILATATION: ICD-10-CM

## 2025-06-04 DIAGNOSIS — I35.1 NONRHEUMATIC AORTIC VALVE INSUFFICIENCY: ICD-10-CM

## 2025-06-04 DIAGNOSIS — R73.03 PREDIABETES: ICD-10-CM

## 2025-06-04 DIAGNOSIS — I10 ESSENTIAL HYPERTENSION: ICD-10-CM

## 2025-06-04 PROCEDURE — 99214 OFFICE O/P EST MOD 30 MIN: CPT | Performed by: STUDENT IN AN ORGANIZED HEALTH CARE EDUCATION/TRAINING PROGRAM

## 2025-06-04 RX ORDER — ASPIRIN 81 MG/1
81 TABLET ORAL DAILY
COMMUNITY

## 2025-06-04 NOTE — PROGRESS NOTES
"        [facility]      Patient Name: Ochoa Mojica  :1965  Age: 59 y.o.  Encounter Provider:  Flakito Toure MD      Chief Complaint:   Chief Complaint   Patient presents with    Syncope and collapse         HPI  Ochoa Mojica is a 59 y.o. male who presents today for follow up. Pt has a  history significant for HTN, HLD, dyspnea, aortic regurgitation, coronary artery disease    Since last visit he tells me has been exercising more and overall feeling better.  No chest pain or shortness of breath.  Still notes some fatigue.  But he does note that it has improved.  No lower extremity edema.  He denies any dizziness      The following portions of the patient's history were reviewed and updated as appropriate: allergies, current medications, past family history, past medical history, past social history, past surgical history and problem list.      Previous Cardiac Testing:  Echocardiogram 2025 with normal EF, normal diastolic function, moderate aortic regurgitation and a dilated aortic root measuring 4.5cm  Coronary CT with a dilated aortic root and severe coronary artery calcifications with mild stenoses.  Benign Holter monitor.  Rare SVT it was asymptomatic  OBJECTIVE:   Vital Signs  Vitals:    25 0838   BP: 96/54   Pulse: 61     Estimated body mass index is 28.48 kg/m² as calculated from the following:    Height as of this encounter: 182.9 cm (72\").    Weight as of this encounter: 95.3 kg (210 lb).    Constitutional:       Appearance: Healthy appearance. Not in distress.   Neck:      Vascular: JVD normal.   Pulmonary:      Effort: Pulmonary effort is normal.      Breath sounds: Normal breath sounds. No wheezing. No rhonchi. No rales.   Cardiovascular:      PMI at left midclavicular line. Normal rate. Regular rhythm. Normal S1. Normal S2.       Murmurs: There is no murmur.      No gallop.  No click. No rub.   Pulses:     Intact distal pulses.   Edema:     Peripheral edema " absent.   Abdominal:      Palpations: Abdomen is soft.      Tenderness: There is no abdominal tenderness.   Musculoskeletal: Normal range of motion. Skin:     General: Skin is warm and dry.   Neurological:      General: No focal deficit present.      Mental Status: Alert and oriented to person, place and time.         Procedures    Lipid Panel          9/27/2024    09:42   Lipid Panel   Total Cholesterol 114    Triglycerides 60    HDL Cholesterol 35    VLDL Cholesterol 13    LDL Cholesterol  66         BUN   Date Value Ref Range Status   04/13/2025 18 8 - 23 mg/dL Final     Creatinine   Date Value Ref Range Status   04/13/2025 0.93 0.76 - 1.27 mg/dL Final     Potassium   Date Value Ref Range Status   04/13/2025 4.0 3.5 - 5.2 mmol/L Final     ALT (SGPT)   Date Value Ref Range Status   04/13/2025 34 1 - 41 U/L Final     AST (SGOT)   Date Value Ref Range Status   04/13/2025 37 1 - 40 U/L Final           ASSESSMENT:      Diagnosis Plan   1. Coronary artery disease involving coronary bypass graft of native heart without angina pectoris        2. Essential hypertension        3. Prediabetes        4. Nonrheumatic aortic valve insufficiency        5. Aortic root dilatation                PLAN OF CARE:     CAD  Severe CAC without significant obstruction on coronary CT 5/2025.   He denies any chest pain.   ASA 81mg, continue statin. LDL 66.   Aortic root dilation: 4.5cm on CT. Repeat in 1 year.   Moderate aortic regurgitation. Will follow with echo in a few years, sooner if needed pending symptoms  Hypertension: Well-controlled on lisinopril and verapamil  Hyperlipidemia: Most recent LDL 9/2024 of 66.  Continue Lipitor 20 mg. Will increase to high intensity dosing pending results of next lipid panel   Prediabetes: A1c 6.3 9/2024    He is overall doing better. No CP or SOB. Will need f/u CT in one year, which I will order at next appointment. Reassess lipids in the fall.     RTC 6 months          Discharge Medications             Accurate as of June 4, 2025  9:19 AM. If you have any questions, ask your nurse or doctor.                Continue These Medications        Instructions Start Date   alfuzosin 10 MG 24 hr tablet  Commonly known as: UROXATRAL   10 mg, Daily      atorvastatin 20 MG tablet  Commonly known as: LIPITOR   Oral, Daily      escitalopram 20 MG tablet  Commonly known as: LEXAPRO   20 mg, Daily      gabapentin 100 MG capsule  Commonly known as: NEURONTIN   100 mg, As Needed      lisinopril 20 MG tablet  Commonly known as: PRINIVIL,ZESTRIL   20 mg, Daily      verapamil  MG CR tablet  Commonly known as: CALAN-SR   180 mg, Oral, Daily               Thank you for allowing me to participate in the care of your patient,      Sincerely,   Flakito Toure MD  [facility]  06/04/25  09:19 EDT

## 2025-06-05 ENCOUNTER — TELEPHONE (OUTPATIENT)
Age: 60
End: 2025-06-05

## 2025-06-05 NOTE — TELEPHONE ENCOUNTER
".    Caller: Ochoa Mojica \"Ed\"    Relationship: Self    Best call back number: [phone]     Caller requesting test results: MRI    What test was performed: MRI SCAN OF THE BRAIN    When was the test performed: 5/27/2025    Where was the test performed: [facility]    Additional notes:     PATIENT IS CALLING TO SEE IF HIS RESULTS ARE BACK AND IF SOMEONE CAN GIVE HIM A CALL AND LET HIM KNOW.        "

## 2025-06-11 ENCOUNTER — TELEPHONE (OUTPATIENT)
Age: 60
End: 2025-06-11
Payer: COMMERCIAL

## 2025-06-11 DIAGNOSIS — I10 ESSENTIAL HYPERTENSION: ICD-10-CM

## 2025-06-11 DIAGNOSIS — E78.2 MIXED HYPERLIPIDEMIA: Primary | ICD-10-CM

## 2025-06-11 DIAGNOSIS — R73.03 PREDIABETES: ICD-10-CM

## 2025-06-11 NOTE — TELEPHONE ENCOUNTER
"Caller: Ochoa Mojica \"Ed\"    Relationship: Self    Best call back number: [phone]     What orders are you requesting (i.e. lab or imaging): PHYSICAL LABS    In what timeframe would the patient need to come in: WEEK PRIOR TO JULY 15    Where will you receive your lab/imaging services: AT PRACTICE      "

## 2025-07-03 LAB
ALBUMIN SERPL-MCNC: 4.5 G/DL (ref 3.5–5.2)
ALP SERPL-CCNC: 94 U/L (ref 39–117)
ALT SERPL-CCNC: 26 U/L (ref 1–41)
AST SERPL-CCNC: 29 U/L (ref 1–40)
BASOPHILS # BLD AUTO: 0.06 10*3/MM3 (ref 0–0.2)
BASOPHILS NFR BLD AUTO: 1.1 % (ref 0–1.5)
BILIRUB DIRECT SERPL-MCNC: 0.2 MG/DL (ref 0–0.3)
BILIRUB SERPL-MCNC: 0.6 MG/DL (ref 0–1.2)
BUN SERPL-MCNC: 20 MG/DL (ref 8–23)
BUN/CREAT SERPL: 19.6 (ref 7–25)
CALCIUM SERPL-MCNC: 10.1 MG/DL (ref 8.6–10.5)
CHLORIDE SERPL-SCNC: 103 MMOL/L (ref 98–107)
CHOLEST SERPL-MCNC: 120 MG/DL (ref 0–200)
CO2 SERPL-SCNC: 25.8 MMOL/L (ref 22–29)
CREAT SERPL-MCNC: 1.02 MG/DL (ref 0.76–1.27)
EGFRCR SERPLBLD CKD-EPI 2021: 83.1 ML/MIN/1.73
EOSINOPHIL # BLD AUTO: 0.08 10*3/MM3 (ref 0–0.4)
EOSINOPHIL NFR BLD AUTO: 1.4 % (ref 0.3–6.2)
ERYTHROCYTE [DISTWIDTH] IN BLOOD BY AUTOMATED COUNT: 13 % (ref 12.3–15.4)
GLUCOSE SERPL-MCNC: 108 MG/DL (ref 65–99)
HBA1C MFR BLD: 6 % (ref 4.8–5.6)
HCT VFR BLD AUTO: 48.6 % (ref 37.5–51)
HDLC SERPL-MCNC: 37 MG/DL (ref 40–60)
HGB BLD-MCNC: 16.7 G/DL (ref 13–17.7)
IMM GRANULOCYTES # BLD AUTO: 0.01 10*3/MM3 (ref 0–0.05)
IMM GRANULOCYTES NFR BLD AUTO: 0.2 % (ref 0–0.5)
LDLC SERPL CALC-MCNC: 69 MG/DL (ref 0–100)
LYMPHOCYTES # BLD AUTO: 1.51 10*3/MM3 (ref 0.7–3.1)
LYMPHOCYTES NFR BLD AUTO: 26.5 % (ref 19.6–45.3)
MCH RBC QN AUTO: 31.8 PG (ref 26.6–33)
MCHC RBC AUTO-ENTMCNC: 34.4 G/DL (ref 31.5–35.7)
MCV RBC AUTO: 92.6 FL (ref 79–97)
MONOCYTES # BLD AUTO: 0.46 10*3/MM3 (ref 0.1–0.9)
MONOCYTES NFR BLD AUTO: 8.1 % (ref 5–12)
NEUTROPHILS # BLD AUTO: 3.58 10*3/MM3 (ref 1.7–7)
NEUTROPHILS NFR BLD AUTO: 62.7 % (ref 42.7–76)
NRBC BLD AUTO-RTO: 0 /100 WBC (ref 0–0.2)
PLATELET # BLD AUTO: 241 10*3/MM3 (ref 140–450)
POTASSIUM SERPL-SCNC: 4.9 MMOL/L (ref 3.5–5.2)
RBC # BLD AUTO: 5.25 10*6/MM3 (ref 4.14–5.8)
SODIUM SERPL-SCNC: 140 MMOL/L (ref 136–145)
TRIGL SERPL-MCNC: 65 MG/DL (ref 0–150)
VLDLC SERPL CALC-MCNC: 14 MG/DL (ref 5–40)
WBC # BLD AUTO: 5.7 10*3/MM3 (ref 3.4–10.8)

## 2025-07-10 DIAGNOSIS — Z76.0 ISSUE OF REPEAT PRESCRIPTION FOR MEDICATION: ICD-10-CM

## 2025-07-10 RX ORDER — VERAPAMIL HYDROCHLORIDE 180 MG/1
180 TABLET, FILM COATED, EXTENDED RELEASE ORAL DAILY
Qty: 90 TABLET | Refills: 1 | Status: SHIPPED | OUTPATIENT
Start: 2025-07-10

## 2025-07-15 ENCOUNTER — OFFICE VISIT (OUTPATIENT)
Age: 60
End: 2025-07-15
Payer: COMMERCIAL

## 2025-07-15 VITALS
SYSTOLIC BLOOD PRESSURE: 134 MMHG | BODY MASS INDEX: 28.44 KG/M2 | TEMPERATURE: 98 F | HEART RATE: 71 BPM | OXYGEN SATURATION: 94 % | WEIGHT: 210 LBS | RESPIRATION RATE: 14 BRPM | DIASTOLIC BLOOD PRESSURE: 70 MMHG | HEIGHT: 72 IN

## 2025-07-15 DIAGNOSIS — Z86.73 HISTORY OF STROKE: ICD-10-CM

## 2025-07-15 DIAGNOSIS — K75.81 NASH (NONALCOHOLIC STEATOHEPATITIS): ICD-10-CM

## 2025-07-15 DIAGNOSIS — G62.9 SENSORY NEUROPATHY: ICD-10-CM

## 2025-07-15 DIAGNOSIS — E78.2 MIXED HYPERLIPIDEMIA: ICD-10-CM

## 2025-07-15 DIAGNOSIS — F32.4 MAJOR DEPRESSIVE DISORDER WITH SINGLE EPISODE, IN PARTIAL REMISSION: ICD-10-CM

## 2025-07-15 DIAGNOSIS — E29.1 HYPOGONADISM MALE: ICD-10-CM

## 2025-07-15 DIAGNOSIS — R73.03 PREDIABETES: ICD-10-CM

## 2025-07-15 DIAGNOSIS — Z00.01 ENCOUNTER FOR PREVENTATIVE ADULT HEALTH CARE EXAM WITH ABNORMAL FINDINGS: Primary | ICD-10-CM

## 2025-07-15 DIAGNOSIS — I10 ESSENTIAL HYPERTENSION: ICD-10-CM

## 2025-07-15 DIAGNOSIS — E55.9 VITAMIN D DEFICIENCY: ICD-10-CM

## 2025-07-15 PROBLEM — E66.09 OTHER OBESITY DUE TO EXCESS CALORIES: Status: ACTIVE | Noted: 2025-07-15

## 2025-07-15 PROCEDURE — 99396 PREV VISIT EST AGE 40-64: CPT | Performed by: FAMILY MEDICINE

## 2025-07-15 NOTE — PROGRESS NOTES
"Chief Complaint  Annual Exam  ANNUAL EXAM  Subjective        Ochoa Mojica presents to [facility]  History of Present Illness    History of Present Illness  The patient presents for an annual exam and to review an MRI of the brain.    He has been diagnosed with high blood pressure and high cholesterol. His current medication regimen includes lisinopril 20 mg daily, verapamil for blood pressure management, and atorvastatin 20 mg daily for cholesterol control. He was prescribed aspirin by a cardiologist following a blackout episode in 03/2025 or 04/2025, which led to an emergency room visit and subsequent cardiac evaluation. The MRI revealed a small lacunar infarct in the basal ganglia, but he reports no residual problems from this.    He also has depression, which is well-managed with Lexapro 20 mg daily. Additionally, he experiences tremors and neuropathy, particularly in his feet and toes, which are more bothersome at night but do not disrupt his sleep. He takes gabapentin as needed for these conditions.    He has a history of low testosterone levels and reports a decreased sex drive. He is interested in exploring testosterone replacement therapy.    He is on alfuzosin for prostate health.    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    Weight as of this encounter: 95.3 kg (210 lb).             Physical Exam  Constitutional:       " General: He is not in acute distress.     Appearance: Normal appearance. He is not ill-appearing, toxic-appearing or diaphoretic.   HENT:      Head: Normocephalic and atraumatic.      Right Ear: There is no impacted cerumen.      Left Ear: There is no impacted cerumen.      Nose: No congestion or rhinorrhea.      Mouth/Throat:      Pharynx: Oropharynx is clear. No oropharyngeal exudate or posterior oropharyngeal erythema.   Eyes:      General: No scleral icterus.        Right eye: No discharge.         Left eye: No discharge.      Extraocular Movements: Extraocular movements intact.      Conjunctiva/sclera: Conjunctivae normal.      Pupils: Pupils are equal, round, and reactive to light.   Cardiovascular:      Rate and Rhythm: Normal rate and regular rhythm.      Pulses: Normal pulses.      Heart sounds: Normal heart sounds.   Pulmonary:      Effort: Pulmonary effort is normal.      Breath sounds: Normal breath sounds.   Abdominal:      General: Abdomen is flat. Bowel sounds are normal.      Palpations: Abdomen is soft.   Musculoskeletal:         General: Normal range of motion.      Cervical back: Normal range of motion and neck supple.   Skin:     General: Skin is warm.   Neurological:      General: No focal deficit present.      Mental Status: He is alert and oriented to person, place, and time. Mental status is at baseline.   Psychiatric:         Mood and Affect: Mood normal.         Behavior: Behavior normal.         Thought Content: Thought content normal.         Judgment: Judgment normal.             Respiratory: Clear to auscultation, no wheezing, rales or rhonchi  Cardiovascular: Regular rate and rhythm, no murmurs, rubs, or gallops         Result Review :           Results  Labs   - LDL cholesterol: 69   - A1c: 6   - B12: 07/2023, 431   - Folic acid: 07/2023, 5.8    Imaging   - MRI of the brain: Small lacunar infarct deep in the basal ganglia             Patient Counseling:  --Nutrition: Stressed  importance of moderation in sodium/caffeine intake, saturated fat and cholesterol, caloric balance, sufficient intake of fresh fruits, vegetables, fiber, calcium, iron, and 1 mg of folate supplement per day (for females capable of pregnancy).  --Exercise: Stressed the importance of regular exercise.   --Substance Abuse: Discussed cessation/primary prevention of tobacco, alcohol, or other drug use; driving or other dangerous activities under the influence; availability of treatment for abuse.    --Sexuality: Discussed sexually transmitted diseases, partner selection, use of condoms, avoidance of unintended pregnancy  and contraceptive alternatives.   --Injury prevention: Discussed safety belts, safety helmets, smoke detector, smoking near bedding or upholstery.   --Dental health: Discussed importance of regular tooth brushing, flossing, and dental visits.  --Immunizations reviewed.  --Discussed benefits of screening colonoscopy.  --After hours service discussed with patient       Assessment and Plan   Diagnoses and all orders for this visit:    1. Encounter for preventative adult health care exam with abnormal findings (Primary)    2. Mixed hyperlipidemia    3. Essential hypertension    4. Major depressive disorder with single episode, in partial remission    5. Prediabetes    6. AZAR (nonalcoholic steatohepatitis)    7. Vitamin D deficiency    8. History of stroke    9. Hypogonadism male  -     Cancel: PSA Diagnostic; Future  -     Cancel: Testosterone, Free, Total  -     Testosterone, Free, Total  -     PSA Diagnostic    10. Sensory neuropathy  -     Cancel: Vitamin B12; Future  -     Cancel: Folate; Future  -     Folate  -     Vitamin B12        Assessment & Plan  1. Hypertension.  - Blood pressure is well-controlled with the current regimen of lisinopril 20 mg daily and verapamil.  - Blood pressure today was 134/70.  - Continue current medications.    2. Hyperlipidemia.  - LDL level is within the target range at  69 mg/dL.  - Cholesterol is treated well.  - Continue taking atorvastatin 20 mg daily.    3. Depression.  - Depression is stable on the current dose of Lexapro 20 mg daily.  - No changes to medication are necessary.  - Continue current medication.    4. Peripheral neuropathy.  - Experiences neuropathy in feet, particularly at night, managed with gabapentin as needed.  - B12 and folate levels were borderline low in 07/2023, with B12 at 431 pg/mL and folic acid at 5.8 ng/mL.  - Blood work will be conducted today to reassess these levels.    5. Low testosterone.  - Reports lack of libido and suspects low testosterone levels.  - Blood test will be conducted today to check testosterone levels.  - If levels are low, prescription for topical testosterone will be sent to pharmacy. Apply one pump on each shoulder daily and let dry for 30-40 minutes before any female contact.  - Follow-up appointment in 3 months to recheck testosterone levels.    6. History of lacunar infarct.  - Had a small lacunar infarct deep in the basal ganglia, as indicated by MRI.  - Currently taking aspirin daily as recommended by cardiologist.            Follow Up   Return in about 3 months (around 10/15/2025).  Patient was given instructions and counseling regarding his condition or for health maintenance advice. Please see specific information pulled into the AVS if appropriate.         Patient or patient representative verbalized consent for the use of Ambient Listening during the visit with  Luis Armando Bolanos MD for chart documentation. 7/15/2025  07:14 EDT

## 2025-07-18 ENCOUNTER — TELEPHONE (OUTPATIENT)
Age: 60
End: 2025-07-18
Payer: COMMERCIAL

## 2025-07-18 LAB
FOLATE SERPL-MCNC: 6.8 NG/ML
PSA SERPL-MCNC: 1.3 NG/ML (ref 0–4)
TESTOST FREE SERPL-MCNC: 9.3 PG/ML (ref 6.6–18.1)
TESTOST SERPL-MCNC: 263 NG/DL (ref 264–916)
VIT B12 SERPL-MCNC: 375 PG/ML (ref 232–1245)

## 2025-07-18 NOTE — TELEPHONE ENCOUNTER
Pt on phone states he saw dr hamilton and was supposed to get a testosterone prescription sent to the pharmacy

## 2025-07-23 ENCOUNTER — OFFICE VISIT (OUTPATIENT)
Age: 60
End: 2025-07-23
Payer: COMMERCIAL

## 2025-07-23 VITALS
RESPIRATION RATE: 14 BRPM | TEMPERATURE: 98 F | WEIGHT: 210 LBS | HEART RATE: 62 BPM | OXYGEN SATURATION: 95 % | BODY MASS INDEX: 28.44 KG/M2 | HEIGHT: 72 IN

## 2025-07-23 DIAGNOSIS — E29.1 HYPOGONADISM MALE: Primary | ICD-10-CM

## 2025-07-23 DIAGNOSIS — I10 ESSENTIAL HYPERTENSION: ICD-10-CM

## 2025-07-23 PROCEDURE — 99213 OFFICE O/P EST LOW 20 MIN: CPT | Performed by: FAMILY MEDICINE

## 2025-07-23 RX ORDER — TESTOSTERONE 20.25 MG/1.25G
GEL TOPICAL
Qty: 75 G | Refills: 2 | Status: SHIPPED | OUTPATIENT
Start: 2025-07-23

## 2025-07-23 RX ORDER — LISINOPRIL 20 MG/1
20 TABLET ORAL DAILY
Qty: 90 TABLET | Refills: 3 | Status: SHIPPED | OUTPATIENT
Start: 2025-07-23

## 2025-07-23 NOTE — PROGRESS NOTES
"Chief Complaint  review labs    Subjective        Ochoa Mojica presents to [facility]  History of Present Illness    History of Present Illness  The patient presents for evaluation of low testosterone and blood pressure management.    He has been diagnosed with low testosterone levels and is interested in exploring replacement therapy as a potential treatment option. He reports experiencing fatigue and sleep disturbances, which he believes may be related to his low testosterone levels.    He is currently on lisinopril for blood pressure management and requires a refill of this medication.       Objective   Vital Signs:  Pulse 62   Temp 98 °F (36.7 °C) (Temporal)   Resp 14   Ht 182.9 cm (72.01\")   Wt 95.3 kg (210 lb)   SpO2 95%   BMI 28.47 kg/m²   Estimated body mass index is 28.47 kg/m² as calculated from the following:    Height as of this encounter: 182.9 cm (72.01\").    Weight as of this encounter: 95.3 kg (210 lb).            Physical Exam  Constitutional:       General: He is not in acute distress.     Appearance: Normal appearance. He is not ill-appearing, toxic-appearing or diaphoretic.   HENT:      Head: Normocephalic and atraumatic.      Right Ear: There is no impacted cerumen.      Left Ear: There is no impacted cerumen.      Nose: No congestion or rhinorrhea.      Mouth/Throat:      Pharynx: Oropharynx is clear. No oropharyngeal exudate or posterior oropharyngeal erythema.   Eyes:      General: No scleral icterus.        Right eye: No discharge.         Left eye: No discharge.      Extraocular Movements: Extraocular movements intact.      Conjunctiva/sclera: Conjunctivae normal.      Pupils: Pupils are equal, round, and reactive to light.   Cardiovascular:      Rate and Rhythm: Normal rate and regular rhythm.      Pulses: Normal pulses.      Heart sounds: Normal heart sounds.   Pulmonary:      Effort: Pulmonary effort is normal.      Breath sounds: Normal breath " [place] ED to IP Report (EDIP)    Mental Status     Alert and oriented and moving all extremities    Safety     None    Tele  Yes    Oxygen Needs  2 LPM supplemental oxygen at baseline    Mobility    Uses Assistive Devices:  Walker    Protocols  GI Bleed    ISAR     0 (04/29/20 1125)    Isolation  None    Additional Information: Pt provided with observation information sheet.   sounds.   Abdominal:      General: Abdomen is flat. Bowel sounds are normal.      Palpations: Abdomen is soft.   Musculoskeletal:         General: Normal range of motion.      Cervical back: Normal range of motion and neck supple.   Skin:     General: Skin is warm.   Neurological:      General: No focal deficit present.      Mental Status: He is alert and oriented to person, place, and time. Mental status is at baseline.   Psychiatric:         Mood and Affect: Mood normal.         Behavior: Behavior normal.         Thought Content: Thought content normal.         Judgment: Judgment normal.                  Result Review :    Results  Labs   - Testosterone: Low              Assessment and Plan   Diagnoses and all orders for this visit:    1. Hypogonadism male (Primary)  -     Testosterone 20.25 MG/1.25GM (1.62%) gel; Apply 2 pumps to shoulder/chest area in AM  Dispense: 75 g; Refill: 2    2. Essential hypertension  -     lisinopril (PRINIVIL,ZESTRIL) 20 MG tablet; Take 1 tablet by mouth Daily. Indications: High Blood Pressure  Dispense: 90 tablet; Refill: 3        Assessment & Plan  1. Low testosterone.  - Reports fatigue and sleep disturbances, potentially related to low testosterone levels.  - Physical exam findings and test results indicate low testosterone.  - Discussed the benefits and application method of topical testosterone, including precautions to avoid skin contact with others.  - Prescribed topical testosterone, to be applied one pump on each shoulder daily. Follow-up appointment scheduled for 6 weeks to monitor progress.    2. Blood pressure management.  - Lisinopril prescription due for refill.  - Blood pressure management discussed.  - Provided a 90-day supply of lisinopril with 3 refills, to be filled at pharmacy.    Follow-up: The patient will follow up in 6 weeks.          Follow Up   No follow-ups on file.  Patient was given instructions and counseling regarding his condition or for health  maintenance advice. Please see specific information pulled into the AVS if appropriate.         Patient or patient representative verbalized consent for the use of Ambient Listening during the visit with  Luis Armando Bolanos MD for chart documentation. 7/23/2025  15:01 EDT

## (undated) DEVICE — GOWN ,SIRUS,NONREINFORCED 3XL: Brand: MEDLINE

## (undated) DEVICE — UNDERCAST PADDING: Brand: DEROYAL

## (undated) DEVICE — ADAPT CLN SCPE ENDO PORPOISE BX/50 DISP

## (undated) DEVICE — ANTIBACTERIAL UNDYED BRAIDED (POLYGLACTIN 910), SYNTHETIC ABSORBABLE SUTURE: Brand: COATED VICRYL

## (undated) DEVICE — APPL CHLORAPREP W/TINT 26ML ORNG

## (undated) DEVICE — GLV SURG BIOGEL LTX PF 8 1/2

## (undated) DEVICE — BNDG ELAS ELITE V/CLOSE 4IN 5YD LF STRL

## (undated) DEVICE — GOWN ISOL W/THUMB UNIV BLU BX/15

## (undated) DEVICE — Device

## (undated) DEVICE — OCCLUSIVE GAUZE STRIP,3% BISMUTH TRIBROMOPHENATE IN PETROLATUM BLEND: Brand: XEROFORM

## (undated) DEVICE — FLEX ADVANTAGE 1500CC: Brand: FLEX ADVANTAGE

## (undated) DEVICE — SUT ETHLN 3/0 PC5 18IN 1893G

## (undated) DEVICE — KT ORCA ORCAPOD DISP STRL

## (undated) DEVICE — CULT AER/ANAEROB FASTIDIOUS BACT

## (undated) DEVICE — BNDG ESMARK 4IN 12FT LF STRL BLU

## (undated) DEVICE — SKIN PREP TRAY W/CHG: Brand: MEDLINE INDUSTRIES, INC.

## (undated) DEVICE — DISPOSABLE TOURNIQUET CUFF SINGLE BLADDER, SINGLE PORT AND QUICK CONNECT CONNECTOR: Brand: COLOR CUFF

## (undated) DEVICE — MAT FLR ABSORBENT LG 4FT 10 2.5FT

## (undated) DEVICE — SPNG LAP 18X18IN LF STRL PK/5

## (undated) DEVICE — GOWN SURG ENDOARMOR LVL3 UNIV KNT/CUF DISP NS

## (undated) DEVICE — PK ORTHO MINOR 40

## (undated) DEVICE — CANN O2 ETCO2 FITS ALL CONN CO2 SMPL A/ 7IN DISP LF